# Patient Record
Sex: MALE | Race: WHITE | NOT HISPANIC OR LATINO | Employment: FULL TIME | URBAN - METROPOLITAN AREA
[De-identification: names, ages, dates, MRNs, and addresses within clinical notes are randomized per-mention and may not be internally consistent; named-entity substitution may affect disease eponyms.]

---

## 2017-01-18 ENCOUNTER — ANESTHESIA EVENT (OUTPATIENT)
Dept: GASTROENTEROLOGY | Facility: AMBULARY SURGERY CENTER | Age: 62
End: 2017-01-18
Payer: COMMERCIAL

## 2017-01-19 ENCOUNTER — ANESTHESIA (OUTPATIENT)
Dept: GASTROENTEROLOGY | Facility: AMBULARY SURGERY CENTER | Age: 62
End: 2017-01-19
Payer: COMMERCIAL

## 2017-01-19 ENCOUNTER — HOSPITAL ENCOUNTER (OUTPATIENT)
Facility: AMBULARY SURGERY CENTER | Age: 62
Setting detail: OUTPATIENT SURGERY
Discharge: HOME | End: 2017-01-19
Attending: INTERNAL MEDICINE | Admitting: INTERNAL MEDICINE
Payer: COMMERCIAL

## 2017-01-19 VITALS
HEIGHT: 68 IN | RESPIRATION RATE: 18 BRPM | DIASTOLIC BLOOD PRESSURE: 71 MMHG | TEMPERATURE: 96.8 F | BODY MASS INDEX: 27.28 KG/M2 | SYSTOLIC BLOOD PRESSURE: 148 MMHG | HEART RATE: 78 BPM | OXYGEN SATURATION: 97 % | WEIGHT: 180 LBS

## 2017-01-19 DIAGNOSIS — Z12.11 ENCOUNTER FOR SCREENING FOR MALIGNANT NEOPLASM OF COLON: ICD-10-CM

## 2017-01-19 DIAGNOSIS — K92.2 GASTROINTESTINAL HEMORRHAGE: ICD-10-CM

## 2017-01-19 PROCEDURE — 88305 TISSUE EXAM BY PATHOLOGIST: CPT | Performed by: INTERNAL MEDICINE

## 2017-01-19 RX ORDER — SODIUM CHLORIDE, SODIUM LACTATE, POTASSIUM CHLORIDE, CALCIUM CHLORIDE 600; 310; 30; 20 MG/100ML; MG/100ML; MG/100ML; MG/100ML
125 INJECTION, SOLUTION INTRAVENOUS CONTINUOUS
Status: DISCONTINUED | OUTPATIENT
Start: 2017-01-19 | End: 2017-01-19 | Stop reason: HOSPADM

## 2017-01-19 RX ORDER — PROPOFOL 10 MG/ML
INJECTION, EMULSION INTRAVENOUS AS NEEDED
Status: DISCONTINUED | OUTPATIENT
Start: 2017-01-19 | End: 2017-01-19 | Stop reason: SURG

## 2017-01-19 RX ADMIN — LIDOCAINE HYDROCHLORIDE 60 MG: 20 INJECTION, SOLUTION INTRAVENOUS at 10:18

## 2017-01-19 RX ADMIN — PROPOFOL 100 MG: 10 INJECTION, EMULSION INTRAVENOUS at 10:29

## 2017-01-19 RX ADMIN — SODIUM CHLORIDE, SODIUM LACTATE, POTASSIUM CHLORIDE, AND CALCIUM CHLORIDE 125 ML/HR: .6; .31; .03; .02 INJECTION, SOLUTION INTRAVENOUS at 10:02

## 2017-01-19 RX ADMIN — PROPOFOL 50 MG: 10 INJECTION, EMULSION INTRAVENOUS at 10:23

## 2017-01-19 RX ADMIN — PROPOFOL 150 MG: 10 INJECTION, EMULSION INTRAVENOUS at 10:18

## 2017-01-22 ENCOUNTER — HOSPITAL ENCOUNTER (EMERGENCY)
Facility: HOSPITAL | Age: 62
Discharge: HOME | End: 2017-01-22
Attending: EMERGENCY MEDICINE
Payer: COMMERCIAL

## 2017-01-22 ENCOUNTER — APPOINTMENT (EMERGENCY)
Dept: RADIOLOGY | Facility: HOSPITAL | Age: 62
End: 2017-01-22
Payer: COMMERCIAL

## 2017-01-22 VITALS
TEMPERATURE: 98 F | RESPIRATION RATE: 20 BRPM | BODY MASS INDEX: 27.37 KG/M2 | HEART RATE: 87 BPM | OXYGEN SATURATION: 98 % | SYSTOLIC BLOOD PRESSURE: 129 MMHG | DIASTOLIC BLOOD PRESSURE: 76 MMHG | WEIGHT: 180 LBS

## 2017-01-22 DIAGNOSIS — J40 BRONCHITIS: Primary | ICD-10-CM

## 2017-01-22 LAB
FLUAV AG SPEC QL IA: NEGATIVE
FLUAV AG SPEC QL: DETECTED
FLUBV AG SPEC QL IA: NEGATIVE
FLUBV AG SPEC QL: ABNORMAL
RSV B RNA SPEC QL NAA+PROBE: ABNORMAL

## 2017-01-22 PROCEDURE — 99284 EMERGENCY DEPT VISIT MOD MDM: CPT

## 2017-01-22 PROCEDURE — 93005 ELECTROCARDIOGRAM TRACING: CPT | Performed by: EMERGENCY MEDICINE

## 2017-01-22 PROCEDURE — 87400 INFLUENZA A/B EACH AG IA: CPT | Performed by: EMERGENCY MEDICINE

## 2017-01-22 PROCEDURE — 87798 DETECT AGENT NOS DNA AMP: CPT | Performed by: EMERGENCY MEDICINE

## 2017-01-22 PROCEDURE — 71020 HB CHEST X-RAY 2VW FRONTAL&LATL: CPT

## 2017-01-22 RX ORDER — ALBUTEROL SULFATE 90 UG/1
2 AEROSOL, METERED RESPIRATORY (INHALATION) EVERY 4 HOURS PRN
Qty: 1 INHALER | Refills: 0 | Status: SHIPPED | OUTPATIENT
Start: 2017-01-22 | End: 2017-02-21

## 2017-01-22 RX ORDER — PREDNISONE 20 MG/1
20 TABLET ORAL DAILY
Qty: 5 TABLET | Refills: 0 | Status: SHIPPED | OUTPATIENT
Start: 2017-01-22 | End: 2017-01-27

## 2017-01-22 RX ORDER — BENZONATATE 100 MG/1
200 CAPSULE ORAL EVERY 8 HOURS
Qty: 10 CAPSULE | Refills: 0 | Status: SHIPPED | OUTPATIENT
Start: 2017-01-22 | End: 2017-01-25

## 2017-01-22 RX ORDER — AZITHROMYCIN 250 MG/1
250 TABLET, FILM COATED ORAL DAILY
Qty: 6 TABLET | Refills: 0 | Status: SHIPPED | OUTPATIENT
Start: 2017-01-22 | End: 2017-01-26

## 2017-01-22 RX ORDER — AZITHROMYCIN 250 MG/1
500 TABLET, FILM COATED ORAL ONCE
Status: COMPLETED | OUTPATIENT
Start: 2017-01-22 | End: 2017-01-22

## 2017-01-22 RX ADMIN — AZITHROMYCIN 500 MG: 250 TABLET, FILM COATED ORAL at 12:07

## 2017-01-24 LAB
ATRIAL RATE: 83 BPM
P AXIS: 81 DEGREES
PR INTERVAL: 146 MS
QRS AXIS: -81 DEGREES
QRSD INTERVAL: 86 MS
QT INTERVAL: 366 MS
QTC INTERVAL: 430 MS
T WAVE AXIS: 65 DEGREES
VENTRICULAR RATE: 83 BPM

## 2017-12-21 ENCOUNTER — GENERIC CONVERSION - ENCOUNTER (OUTPATIENT)
Dept: OTHER | Facility: OTHER | Age: 62
End: 2017-12-21

## 2017-12-21 ENCOUNTER — HOSPITAL ENCOUNTER (OUTPATIENT)
Dept: RADIOLOGY | Facility: HOSPITAL | Age: 62
Discharge: HOME | End: 2017-12-21
Attending: INTERNAL MEDICINE
Payer: COMMERCIAL

## 2017-12-21 ENCOUNTER — TRANSCRIBE ORDERS (OUTPATIENT)
Dept: ADMINISTRATIVE | Facility: HOSPITAL | Age: 62
End: 2017-12-21

## 2017-12-21 DIAGNOSIS — R05.9 COUGH: ICD-10-CM

## 2017-12-21 DIAGNOSIS — J06.9 UPPER RESPIRATORY TRACT INFECTION, UNSPECIFIED TYPE: Primary | ICD-10-CM

## 2017-12-21 DIAGNOSIS — R06.02 SOB (SHORTNESS OF BREATH): ICD-10-CM

## 2017-12-21 PROCEDURE — 71020 HB CHEST X-RAY 2VW FRONTAL&LATL: CPT

## 2017-12-23 ENCOUNTER — HOSPITAL ENCOUNTER (EMERGENCY)
Facility: HOSPITAL | Age: 62
Discharge: HOME | End: 2017-12-23
Attending: EMERGENCY MEDICINE | Admitting: EMERGENCY MEDICINE
Payer: COMMERCIAL

## 2017-12-23 VITALS
BODY MASS INDEX: 26.84 KG/M2 | HEIGHT: 67 IN | TEMPERATURE: 96.9 F | RESPIRATION RATE: 24 BRPM | DIASTOLIC BLOOD PRESSURE: 93 MMHG | OXYGEN SATURATION: 92 % | SYSTOLIC BLOOD PRESSURE: 136 MMHG | WEIGHT: 171 LBS | HEART RATE: 81 BPM

## 2017-12-23 DIAGNOSIS — J44.9 COPD (CHRONIC OBSTRUCTIVE PULMONARY DISEASE) (HCC): Primary | ICD-10-CM

## 2017-12-23 PROCEDURE — 94640 AIRWAY INHALATION TREATMENT: CPT

## 2017-12-23 PROCEDURE — 99283 EMERGENCY DEPT VISIT LOW MDM: CPT

## 2017-12-23 RX ORDER — AMOXICILLIN AND CLAVULANATE POTASSIUM 500; 125 MG/1; MG/1
1 TABLET, FILM COATED ORAL EVERY 8 HOURS SCHEDULED
COMMUNITY
End: 2019-03-15 | Stop reason: ALTCHOICE

## 2017-12-23 RX ORDER — PREDNISONE 50 MG/1
50 TABLET ORAL DAILY
Qty: 5 TABLET | Refills: 0 | Status: SHIPPED | OUTPATIENT
Start: 2017-12-23 | End: 2017-12-28

## 2017-12-23 RX ORDER — GUAIFENESIN/DEXTROMETHORPHAN 100-10MG/5
10 SYRUP ORAL ONCE
Status: COMPLETED | OUTPATIENT
Start: 2017-12-23 | End: 2017-12-23

## 2017-12-23 RX ORDER — PREDNISONE 20 MG/1
60 TABLET ORAL ONCE
Status: COMPLETED | OUTPATIENT
Start: 2017-12-23 | End: 2017-12-23

## 2017-12-23 RX ORDER — ALBUTEROL SULFATE 90 UG/1
2 AEROSOL, METERED RESPIRATORY (INHALATION) EVERY 6 HOURS PRN
COMMUNITY
End: 2018-01-31 | Stop reason: SDUPTHER

## 2017-12-23 RX ADMIN — ALBUTEROL SULFATE 5 MG: 2.5 SOLUTION RESPIRATORY (INHALATION) at 08:33

## 2017-12-23 RX ADMIN — GUAIFENESIN AND DEXTROMETHORPHAN 10 ML: 100; 10 SYRUP ORAL at 08:32

## 2017-12-23 RX ADMIN — IPRATROPIUM BROMIDE 0.5 MG: 0.5 SOLUTION RESPIRATORY (INHALATION) at 08:33

## 2017-12-23 RX ADMIN — PREDNISONE 60 MG: 20 TABLET ORAL at 08:31

## 2017-12-23 NOTE — ED PROVIDER NOTES
History  Chief Complaint   Patient presents with    Cough     patient seen by Cheng Pickens on Monday for cough and congestion  Placed on antibiotics  Has increased SOB on ambulation, so Scot put him on Proventil  63-year-old male with past medical history of arthritis and tobacco abuse, presents to the ER with complaint of shortness of breath over the past week  Patient saw his PCP 4 days ago and was placed on Augmentin and albuterol  Patient had a chest x-ray done 2 days ago that showed concern for COPD however no acute infiltrates  Patient came to the ER as his shortness of breath persists  Patient notes that he has had dry cough  Patient denies any chest pain, fevers  Patient has had some chills  Patient continues to be a everyday smoker  History provided by:  Patient  Cough   Associated symptoms: shortness of breath    Associated symptoms: no chest pain, no fever, no headaches, no sore throat and no wheezing        Prior to Admission Medications   Prescriptions Last Dose Informant Patient Reported? Taking? albuterol (PROVENTIL HFA,VENTOLIN HFA) 90 mcg/act inhaler 12/23/2017 at Unknown time  Yes Yes   Sig: Inhale 2 puffs every 6 (six) hours as needed for wheezing   amoxicillin-clavulanate (AUGMENTIN) 500-125 mg per tablet 12/23/2017 at Unknown time  Yes Yes   Sig: Take 1 tablet by mouth every 8 (eight) hours   aspirin 81 MG tablet 12/23/2017 at Unknown time  Yes Yes   Sig: Take 81 mg by mouth daily   nicotine (NICODERM CQ) 7 mg/24hr TD 24 hr patch 12/23/2017 at Unknown time  Yes Yes   Sig: Place 1 patch on the skin every 24 hours      Facility-Administered Medications: None       Past Medical History:   Diagnosis Date    Arthritis     hands       Past Surgical History:   Procedure Laterality Date    COLONOSCOPY N/A 1/19/2017    Procedure: COLONOSCOPY;  Surgeon: Ant Linares MD;  [facility]; Service:        History reviewed  No pertinent family history    I have reviewed and agree with the history as documented  Social History   Substance Use Topics    Smoking status: Former Smoker     Packs/day: 1 00     Years: 43 00    Smokeless tobacco: Never Used      Comment: quit two days ago 12/21/2017    Alcohol use Yes      Comment: weekends        Review of Systems   Constitutional: Negative for activity change, fatigue and fever  HENT: Negative for congestion, ear discharge and sore throat  Eyes: Negative for pain and redness  Respiratory: Positive for cough and shortness of breath  Negative for chest tightness and wheezing  Cardiovascular: Negative for chest pain  Gastrointestinal: Negative for abdominal pain, diarrhea, nausea and vomiting  Endocrine: Negative for cold intolerance  Genitourinary: Negative for dysuria and urgency  Musculoskeletal: Negative for arthralgias and back pain  Neurological: Negative for dizziness, weakness and headaches  Psychiatric/Behavioral: Negative for agitation and behavioral problems  Physical Exam  ED Triage Vitals   Temperature Pulse Respirations Blood Pressure SpO2   12/23/17 0803 12/23/17 0803 12/23/17 0803 12/23/17 0806 12/23/17 0803   (!) 96 9 °F (36 1 °C) 81 (!) 24 136/93 92 %      Temp Source Heart Rate Source Patient Position - Orthostatic VS BP Location FiO2 (%)   12/23/17 0803 12/23/17 0803 12/23/17 0806 12/23/17 0806 --   Tympanic Monitor Sitting Left arm       Pain Score       12/23/17 0803       No Pain           Orthostatic Vital Signs  Vitals:    12/23/17 0803 12/23/17 0806   BP:  136/93   Pulse: 81    Patient Position - Orthostatic VS:  Sitting       Physical Exam   Constitutional: He is oriented to person, place, and time  He appears well-developed and well-nourished  HENT:   Head: Normocephalic and atraumatic  Nose: Nose normal    Mouth/Throat: Oropharynx is clear and moist    Eyes: Conjunctivae and EOM are normal    Neck: Normal range of motion  Neck supple     Cardiovascular: Normal rate, regular rhythm and normal heart sounds  Pulmonary/Chest: Effort normal  He has wheezes  Scattered expiratory wheezing noted throughout lungs bilateral    Abdominal: Soft  Bowel sounds are normal  He exhibits no distension  There is no tenderness  Musculoskeletal: Normal range of motion  Neurological: He is alert and oriented to person, place, and time  Skin: Skin is warm  Psychiatric: He has a normal mood and affect  His behavior is normal  Judgment and thought content normal    Nursing note and vitals reviewed  ED Medications  Medications   predniSONE tablet 60 mg (60 mg Oral Given 12/23/17 0831)   albuterol inhalation solution 5 mg (5 mg Nebulization Given 12/23/17 0833)   ipratropium (ATROVENT) 0 02 % inhalation solution 0 5 mg (0 5 mg Nebulization Given 12/23/17 0833)   dextromethorphan-guaiFENesin (ROBITUSSIN DM)  mg/5 mL oral syrup 10 mL (10 mL Oral Given 12/23/17 3334)       Diagnostic Studies  Results Reviewed     None                 No orders to display              Procedures  Procedures       Phone Contacts  ED Phone Contact    ED Course  ED Course as of Dec 23 0856   Sat Dec 23, 2017   9183 Patient re-examined at bedside  Patient feels better with steroids and neb treatment  MDM  Number of Diagnoses or Management Options  COPD (chronic obstructive pulmonary disease) [facility]):   Diagnosis management comments: Review chest x-ray from 2 days ago that was unremarkable  Give prednisone, neb treatment, Robitussin and reassess symptoms  Amount and/or Complexity of Data Reviewed  Tests in the radiology section of CPT®: reviewed  Tests in the medicine section of CPT®: ordered and reviewed  Review and summarize past medical records: yes  Independent visualization of images, tracings, or specimens: yes    Risk of Complications, Morbidity, and/or Mortality  General comments: Patient's lung aeration improved with nebulizer and steroids    Patient states he felt much better after the medications  Scattered wheezing continued throughout bilateral lungs  At this point patient's symptoms are most likely secondary to bronchitis/COPD exacerbation  Patient is discharged home with continuation of his antibiotics and albuterol inhaler as prescribed by his PCP  Patient is prescribed prednisone burst   Patient to follow up with his PCP in 2-3 days  Patient agrees with discharge plan  Patient well appearing at time of discharge  Patient Progress  Patient progress: improved    CritCare Time    Disposition  Final diagnoses:   COPD (chronic obstructive pulmonary disease) ([facility] [place] [address] )     Time reflects when diagnosis was documented in both MDM as applicable and the Disposition within this note     Time User Action Codes Description Comment    12/23/2017  8:12 AM Rayna Bowling Add [J44 9] COPD (chronic obstructive pulmonary disease) [facility])       ED Disposition     ED Disposition Condition Comment    Discharge  Brenita Reveal discharge to home/self care  Condition at discharge: Good        Follow-up Information     Follow up With Specialties Details Why Mouna Chris MD Internal Medicine In 3 days  [address] [id number]  [phone]          Patient's Medications   Discharge Prescriptions    PREDNISONE 50 MG TABLET    Take 1 tablet by mouth daily for 5 days       Start Date: 12/23/2017End Date: 12/28/2017       Order Dose: 50 mg       Quantity: 5 tablet    Refills: 0     No discharge procedures on file      ED Provider  Electronically Signed by           Gabby Ortez DO  12/23/17 3974

## 2017-12-23 NOTE — DISCHARGE INSTRUCTIONS
Please take a list of all of your medications and discharge paperwork with you to all of your follow-up medical visits  Please take all of your medications as directed  Continue your albuterol inhaler every 6 hours for shortness of breath  You can take over-the-counter Robitussin for cough  Please call your family doctor or return to the ER if you have increased shortness of breath, chest pain, fevers, chills, nausea, vomiting, diarrhea, or any other worsening symptoms  COPD (Chronic Obstructive Pulmonary Disease)   WHAT YOU NEED TO KNOW:   Chronic obstructive pulmonary disease (COPD) is a lung disease that makes it hard for you to breathe  It is usually a result of lung damage caused by years of irritation and inflammation in your lungs  DISCHARGE INSTRUCTIONS:   Call 911 if:   · You feel lightheaded, short of breath, and have chest pain  Return to the emergency department if:   · You are confused, dizzy, or feel faint  · Your arm or leg feels warm, tender, and painful  It may look swollen and red  · You cough up blood  Contact your healthcare provider if:   · You have more shortness of breath than usual      · You need more medicine than usual to control your symptoms  · You are coughing or wheezing more than usual      · You are coughing up more mucus, or it is a different color or has a different odor  · You gain more than 3 pounds in a week  · You have a fever, a runny or stuffy nose, and a sore throat, or other cold or flu symptoms  · Your skin, lips, or nails start to turn blue  · You have swelling in your legs or ankles  · You are very tired or weak for more than a day  · You notice changes in your mood, or changes in your ability to think or concentrate  · You have questions or concerns about your condition or care  Medicines:   · Medicines  may be used to open your airways, decrease swelling and inflammation in your lungs, or treat an infection  You may need 2 or more medicines  A short-acting medicine relieves symptoms quickly  Long-acting medicines will control or prevent symptoms  Ask for more information about the medicines you are given and how to use them safely  · Take your medicine as directed  Contact your healthcare provider if you think your medicine is not helping or if you have side effects  Tell him or her if you are allergic to any medicine  Keep a list of the medicines, vitamins, and herbs you take  Include the amounts, and when and why you take them  Bring the list or the pill bottles to follow-up visits  Carry your medicine list with you in case of an emergency  Help make breathing easier:   · Use pursed-lip breathing any time you feel short of breath  Take a deep breath in through your nose  Slowly breathe out through your mouth with your lips pursed for twice as long as you inhaled  You can also practice this breathing pattern while you bend, lift, climb stairs, or exercise  It slows down your breathing and helps move more air in and out of your lungs  · Do not smoke, and avoid others who smoke  Nicotine and other substances can cause lung irritation or damage and make it harder for you to breathe  Do not use e-cigarettes or smokeless tobacco  They still contain nicotine  Ask your healthcare provider for information if you currently smoke and need help to quit  For support and more information:  ¨ [employer]  Phone: 3- 092 - 371-7986  Web Address: [employer]      · Be aware of and avoid anything that makes your symptoms worse  Stay out of high altitudes and places with high humidity  Stay inside, or cover your mouth and nose with a scarf when you are outside during cold weather  Stay inside on days when air pollution or pollen counts are high  Do not use aerosol sprays such as deodorant, bug spray, and hair spray    Manage COPD and help prevent exacerbations:  COPD is a serious condition that gets worse over time  A COPD exacerbation means your symptoms suddenly get worse  It is important to prevent exacerbations  An exacerbation can cause more lung damage  COPD cannot be cured, but you can take action to feel better and prevent COPD exacerbations:  · Protect yourself from germs  Germs can get into your lungs and cause an infection  An infection in your lungs can create more mucus and make it harder to breathe  An infection can also create swelling in your airways and prevent air from getting in  You can decrease your risk for infection by doing the following:     [facility] your hands often with soap and water  Carry germ-killing gel with you  You can use the gel to clean your hands when soap and water are not available  ¨ Do not touch your eyes, nose, or mouth unless you have washed your hands first      ¨ Always cover your mouth when you cough  Cough into a tissue or your shirtsleeve so you do not spread germs from your hands  ¨ Try to avoid people who have a cold or the flu  If you are sick, stay away from others as much as possible  · Drink more liquids  This will help to keep your air passages moist and help you cough up mucus  Ask how much liquid to drink each day and which liquids are best for you  · Exercise daily  Exercise for at least 20 minutes each day to help increase your energy and decrease shortness of breath  Walking or riding a bike are good ways to exercise  Talk to your healthcare provider about the best exercise plan for you  · Ask about vaccines  Your healthcare provider may recommend that you get regular flu and pneumonia vaccines  Pneumonia can become life-threatening for a person who has COPD  Ask about other vaccines you may need  Ask your healthcare provider about the flu and pneumonia vaccines  All adults should get the flu (influenza) vaccine every year as soon as it becomes available   The pneumonia vaccine is given to adults aged 72 or older to prevent pneumococcal disease, such as pneumonia  Adults aged 23 to 59 years who are at high risk for pneumococcal disease also should get the pneumococcal vaccine  It may need to be repeated 1 or 5 years later  Pulmonary rehabilitation:  Your healthcare provider may recommend a program to help you manage your symptoms and improve your quality of life  It may include nutritional counseling and exercise to strengthen your lungs  Make decisions about your choices for future treatment:  Ask for information about advanced medical directives and living alvarez  These documents help you decide and write down your choices for treatment and end-of-life care  It is best to complete them when you feel well and can think clearly about your wishes  The information can then be kept for future use if you are in the hospital or become very ill  Follow up with your healthcare provider as directed: You may need more tests  Your healthcare provider may refer you to a pulmonary (lung) specialist  Write down your questions so you remember to ask them during your visits  © 2017 [address] [employer] Information is for End User's use only and may not be sold, redistributed or otherwise used for commercial purposes  All illustrations and images included in CareNotes® are the copyrighted property of [employer]  or [employer]itesh Lennon  The above information is an  only  It is not intended as medical advice for individual conditions or treatments  Talk to your doctor, nurse or pharmacist before following any medical regimen to see if it is safe and effective for you

## 2017-12-26 ENCOUNTER — GENERIC CONVERSION - ENCOUNTER (OUTPATIENT)
Dept: OTHER | Facility: OTHER | Age: 62
End: 2017-12-26

## 2017-12-26 ENCOUNTER — ALLSCRIPTS OFFICE VISIT (OUTPATIENT)
Dept: OTHER | Facility: OTHER | Age: 62
End: 2017-12-26

## 2017-12-27 NOTE — CONSULTS
Assessment   1  COPD with acute exacerbation (491 21) (J44 1)   2  Centrilobular emphysema (492 8) (J43 2)   3  Current every day smoker (305 1) (F17 200)   4  Dyspnea on exertion (786 09) (R06 09)    Plan   Centrilobular emphysema    · Albuterol Sulfate (2 5 MG/3ML) 0 083% Inhalation Nebulization Solution; USE 1    UNIT DOSE IN NEBULIZER 4 TIMES DAILY   Rx By: Breanna Corcoran; Dispense: 30 Days ; #:3 X 3 ML Plas Cont (60 Plas Conts); Refill: 5;For: Centrilobular emphysema; MICHELLE = N; Verified Transmission to [address] [place] [address]; Last Updated By: System, Sure[employer]; 12/26/2017 2:19:14 PM   · Anoro Ellipta 62 5-25 MCG/INH Inhalation Aerosol Powder Breath Activated; INHALE    1 PUFFS Daily   Rx By: Breanna Corcoran; Dispense: 30 Days ; #:1 Aerosol Powder Breath Activated; Refill: 5;For: Centrilobular emphysema; MICHELLE = N; Print Rx  Centrilobular emphysema, COPD with acute exacerbation    · PredniSONE 10 MG Oral Tablet; take 4 tabs/day x 3 days, 3 tabs/ day x 3 days, 2    tabs/day x 3 days, then 1 tab/day x 3 days   Rx By: Breanna Corcoran; Dispense: 12 Days ; #:30 Tablet; Refill: 0;For: Centrilobular emphysema, COPD with acute exacerbation; MICHELLE = N; Verified Transmission to [address] [place] [address]; Last Updated By: System, SureScripts; 12/26/2017 2:19:14 PM   · Respiratory Equipment Nebulizer; Status:Complete;   Done: 74FPP6721 02:09PM   Perform:Not Applicable; Order Comments:[facility]; Due:[id number];Ordered; For:Centrilobular emphysema, COPD with acute exacerbation; Ordered By:Jameson Nicolas;  Centrilobular emphysema, Dyspnea on exertion    · SPIROMETRY W/ BRONCHO- POC; Status:Complete;   Done: 32FVM1419 12:00AM   Performed: In Office; KIQ:[id number];FSPWDRQ; Today; For:Centrilobular emphysema, Dyspnea on exertion; Ordered By:Jameson Nicolas;  Unlinked    · Ipratropium-Albuterol 0 5-2 5 (3) MG/3ML Inhalation Solution   Dose of 1 VIAL;  Inhalation; MICHELLE = N; Administered by: Abraham Anger: 12/26/2017 12:00:00 AM; Last Updated By: Zoran Guerin; 12/26/2017 1:28:59 PM    Results/Data   Results Free Text Form [facility]:    Results      Other room air O2 sat at rest is 91 o 93% and with ambulating 250 feet was 91%  PFT Results v2:      Spirometry: Forced vital capacity: 2 46L-- and-- 58% Predicted Values  Forced expiratory volume in one second: 0 92L-- and-- 29% Predicted Value  FEV1/FVC ratio is 37  Post Bronchodilator Spirometry: Forced vital capacity : 3 06L-- and-- 73% Predicted Values  Forced expiratory volume in one second : 1 05L-- and-- 33% Predicted Value  FEV1/FVC ratio is 34  Lung Volumes:    DLCO:       PFT Interpretation:      Severe airflow obstruction with a significant improvement following bronchodilator  Forced vital capacity improved by 24% and FEV1 improved by 15%  Mild restrictive impairment likely due to air trapping  Discussion/Summary   Discussion Summary:    Acute exacerbation of COPD which was triggered by upper respiratory tract infection  He completed a course of Augmentin  Spirometry today shows severe airflow obstruction with a significant improvement in FEV1 of 15% after bronchodilator therapy  Even after bronchodilator therapy FEV1 is severely decreased at 1 05 L or 33% of predicted  Obstructive index is also severely decreased at 34%  The mild decrease in forced vital capacity of 3 06 L 73% of predicted is likely due to air trapping from COPD    just quit smoking 1 week ago and states he is going to remain smoke free  I encouraged him to remain smoke-free and stressed critical importance of this  This time does not feel he needs any type of nicotine supplement or Chantix  does have a Proventil inhaler  He will complete 2 more days of prednisone 50 mg which was prescribed to many ER on is seen for shortness of breath on 12/23  This be 5 days of prednisone 50 mg daily   He was then take 40 mg of prednisone for 3 days and taper by 10 mg every 4th day  Last dose of prednisone will be January 9th  did give him a sample of Anoro to use 1 puff daily and demonstrated to him how to use it  I also prescribed a nebulizer which he will use 2 5 mg of albuterol and 4 times a day  He also stops to his Proventil inhaler for this   works as a  at a school and I advised 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 to return to work to re-evaluate him on January 9th  did discuss the diagnosis of COPD with him the critical importance of him remaining smoke-free  Also at a later date I will order a complete pulmonary function test on Mami Maldonado    will do spirometry next office visit to see if there is any further improvement in his severe airflow obstruction with above therapy  also range from the have a home nebulizer with 2 5 mg albuterol he can use 4 times a day for now  did review his chest x-ray that was done December 21st  No infiltrates  in 2 weeks  Goals and Barriers: The patient has the current Goals: Try to refrain from smoking cigarettes  Patient's Capacity to Self-Care: Patient is able to Self-Care  Medication SE Review and Pt Understands Tx: Possible side effects of new medications were reviewed with the patient/guardian today  The treatment plan was reviewed with the patient/guardian  The patient/guardian understands and agrees with the treatment plan      Active Problems    · Encounter for screening for malignant neoplasm of colon (V76 51) (Z12 11)    Chief Complaint   Chief Complaint Free Text Note Form: Mami Maldonado is here today for c/o SOB increasing over the last 5 days  History of Present Illness   HPI: Mami Maldonado is a 58year-old male who presents for evaluation of severe dyspnea since feeling ill about 1 week ago  He states he started to have some fatigue, nonproductive cough and some itching in his ears  No fever chills  He saw his physician, Dr Clark Pineda, and was prescribed Augmentin 500 mg t i d  for 7 days for what appeared to be bilateral otitis media   Maurie Nissen as noted has had increasing shortness of breath since then  And scan to the point were used was feeling fatigued anemia and shortness of Breath with trying to put his clothes on  Earlier when he had been treated for his URI with augment he had to work the evening shift as a  at a school as there was no one to replace some  He states he had great difficulty doing his job was very short of breath with doing the cleaning chores  He is not have any chest pain fever chills  As he was still having some cough congestion shortness of breath he went to emergency room for evaluation on December 23rd  His room air O2 saturation was 92% and he was noted to have some wheezing  He was given a nebulizer treatment with albuterol ipratropium and also given prednisone 60 mg per day  He then received prescription of prednisone 50 mg daily for 5 days  Chest x-ray done then showed no infiltrates  Despite the prednisone he still having shortness of breath with minimal activity  No nocturnal dyspnea  His cough has improved  He is not having any chest pain  He does have occasional wheezing  He states he has not smoked in 1 week now  Normally when he is not L he states he will have some shortness of breath going up a flight of stairs but he is able to do his duties as a  without any shortness of breath  He was also prescribed Benzonatate 100 mg that he used for his cough and Robitussin with codeine  does not have any history of heart disease, hypertension, or hyperlipidemia  He was prescribed Proventil inhaler recently but normally is not on any medication  He has no prior history of COPD or asthma      is not have any fever or chills  Review of Systems   Complete-Male Pulm:      Constitutional: No fever or chills, feels well, no tiredness, no recent weight gain or weight loss  Eyes: no complaints of vision problems  ENT: no rhinitis, no PND, no epistaxis        Cardiovascular: no palpitations, no chest pain  Respiratory: as noted in HPI  Gastrointestinal: no complaints of esophageal reflux, no abdominal pain  Genitourinary: no urinary retention  Musculoskeletal: no arthralgias, no joint swelling, no myalgias  Integumentary: no rash, no lesions  Neurological: no headache, no fainting, no weakness  Psychiatric: no anxiety, no depression  Hematologic/Lymphatic: no complaints of swollen glands  ROS Reviewed:    ROS reviewed  Surgical History   Surgical History Reviewed: The surgical history was reviewed and updated today  Family History   Mother    1  Family history of cardiac disorder (V17 49) (Z82 49)  Family History Reviewed: The family history was reviewed and updated today  Social History    · Current every day smoker (305 1) (F17 200)   · Patient has smoked anywhere from 1-1 5 packs of cigarettes per day since age 25  States he only quit 1 week ago December 15, 2017 since he has been sick with        this URI and shortness of Breath  Social History Reviewed: The social history was reviewed and updated today  The social history was reviewed and is unchanged  Current Meds    1  Cheratussin -10 MG/5ML Oral Syrup; Therapy: (Recorded:42Cnu9365) to Recorded   2  Claritin-D 12 Hour 5-120 MG Oral Tablet Extended Release 12 Hour; As needed for sinus     pain; Therapy: 10QFJ9939 to  Requested for: 71DLH2129 Recorded   3  Motrin 800 MG TABS; As needed for neck pain; Therapy: 43VQO8790 to  Requested for: 39IXL6946 Recorded   4  PredniSONE 50 MG Oral Tablet; Therapy: (Recorded:99Drg6202) to Recorded   5  Proventil  (90 Base) MCG/ACT Inhalation Aerosol Solution; Therapy: (Recorded:26Pum8522) to Recorded   6  Suprep Bowel Prep Kit 17 5-3 13-1 6 GM/180ML Oral Solution; as directed; Therapy: 76WXJ9140 to  Requested for: 91Yfd0372 Recorded  Medication List Reviewed:     The medication list was reviewed and updated today  Allergies   1  No Known Drug Allergies    Vitals   Vital Signs    Recorded: 33CDY4711 12:55PM   Temperature 97 6 F, Oral   Heart Rate 80   Pulse Quality Normal   Respiration Quality Normal   Respiration 16   Systolic 674, RUE, Sitting   Diastolic 88, RUE, Sitting   Height 5 ft 7 in   Weight 163 lb    BMI Calculated 25 53   BSA Calculated 1 85   O2 Saturation 91, RA     Physical Exam        Constitutional      General appearance: No acute distress, well appearing and well nourished  Eyes      Examination of pupil and irises: Anicteric, pupils reactive  Ears, Nose, Mouth, and Throat      External inspection of ears and nose: Normal        Nasal mucosa, septum, and turbinates: Normal without edema or erythema  Lips, teeth, and gums: Normal, good dentition  Oropharynx: Normal with no erythema, edema, exudate or lesions  Neck      Neck: Supple, symmetric, trachea midline, no masses  Jugular veins: Normal        Pulmonary      Chest: Normal        Respiratory effort: No increased work of breathing or signs of respiratory distress  Auscultation of lungs: Abnormal   Auscultation of the lungs revealed decreased breath sounds diffusely  no rales or crackles were heard bilaterally  no rhonchi  no wheezing  Cardiovascular      Auscultation of heart: Normal rate and rhythm, normal S1 and S2, no murmurs  Examination of extremities for edema and/or varicosities: Normal        Abdomen      Abdomen: Soft, non-tender  Lymphatic      Palpation of lymph nodes in neck: No lymphadenopathy  Musculoskeletal      Gait and station: Normal        Digits and nails: Normal without clubbing or cyanosis  Neurologic      Mental Status: Normal  Not confused, no evidence of dementia, good comprehension, good concentration  Motor Exam: Gross motor function normal        Skin      Skin and subcutaneous tissue: Limited exam shows no rash         Psychiatric Orientation to person, place and time: Normal        Mood and affect: Normal        Future Appointments      Date/Time Provider Specialty Site   01/09/2018 12:30 PM VERONICA Stinson  Pulmonary Medicine [facility] PULMONARY [facility] DIAGNOSTIC [facility]   12/28/2017 01:00 PM MARK Talley   Cardiology  [address] [facility]     Signatures    Electronically signed by : VERONICA Cespedes ; Dec 26 2017  3:15PM EST                       (Author)

## 2017-12-29 ENCOUNTER — GENERIC CONVERSION - ENCOUNTER (OUTPATIENT)
Dept: SLEEP CENTER | Facility: CLINIC | Age: 62
End: 2017-12-29

## 2018-01-08 ENCOUNTER — GENERIC CONVERSION - ENCOUNTER (OUTPATIENT)
Dept: OTHER | Facility: OTHER | Age: 63
End: 2018-01-08

## 2018-01-23 VITALS
RESPIRATION RATE: 16 BRPM | SYSTOLIC BLOOD PRESSURE: 150 MMHG | TEMPERATURE: 97.6 F | DIASTOLIC BLOOD PRESSURE: 88 MMHG | HEIGHT: 67 IN | BODY MASS INDEX: 25.58 KG/M2 | WEIGHT: 163 LBS | OXYGEN SATURATION: 91 % | HEART RATE: 80 BPM

## 2018-01-23 NOTE — MISCELLANEOUS
Message  Return to work or school:   Junaa Pablo is under my professional care  He was seen in my office on 12/26/2017   He is able to return to work on  01/10/2017            Signatures  Electronically signed by :  Lu Shields, ; Dec 26 2017  2:06PM EST                       (Author)

## 2018-01-23 NOTE — MISCELLANEOUS
Message  Return to work or school:   Alla Higginbotham is under my professional care  He was seen in my office on 12/26/2017    He is not able to return to work until 02/01/2018     Patient may not return back to work until seen again on 1/31/2018  Signatures   Electronically signed by :  Olya Waters, ; Jan 8 2018  1:12PM EST                       (Author)

## 2018-01-31 ENCOUNTER — OFFICE VISIT (OUTPATIENT)
Dept: PULMONOLOGY | Facility: MEDICAL CENTER | Age: 63
End: 2018-01-31
Payer: COMMERCIAL

## 2018-01-31 VITALS
SYSTOLIC BLOOD PRESSURE: 142 MMHG | TEMPERATURE: 98.3 F | OXYGEN SATURATION: 96 % | BODY MASS INDEX: 27.57 KG/M2 | DIASTOLIC BLOOD PRESSURE: 86 MMHG | WEIGHT: 176 LBS | HEART RATE: 74 BPM

## 2018-01-31 DIAGNOSIS — J43.2 CENTRILOBULAR EMPHYSEMA (HCC): Primary | ICD-10-CM

## 2018-01-31 PROCEDURE — 99213 OFFICE O/P EST LOW 20 MIN: CPT | Performed by: INTERNAL MEDICINE

## 2018-01-31 RX ORDER — ALBUTEROL SULFATE 90 UG/1
2 AEROSOL, METERED RESPIRATORY (INHALATION) EVERY 4 HOURS PRN
Qty: 1 INHALER | Refills: 5 | Status: SHIPPED | OUTPATIENT
Start: 2018-01-31 | End: 2019-07-15 | Stop reason: SDUPTHER

## 2018-01-31 NOTE — PROGRESS NOTES
Assessment/Plan:     Problem List Items Addressed This Visit        Respiratory    Centrilobular emphysema ([address] ) - Primary     Centrilobular emphysema  Eunice Mack had a recent exacerbation for which I treated him with prednisone and he had a marked improvement  He only has minimal exertional dyspnea now with activity  He is using Anoro 1 puff daily and will continue with this medication  He has not smoked now in 6 weeks    He does have a ProAir rescue inhaler can use 2 puffs every 4 hours as needed but is not needing it now    I will order a complete pulmonary function test with diffusion capacity to recess is lung function  On previous initial visit he had severe airflow obstruction  I suspect there may be some improvement in his degree of airflow obstruction out and he has improved with prednisone therapy  On December 26 his FEV1 was severely decreased at 1 05 L or 33% predicted after a nebulizer treatment  I did advise him not to return to work until February 19, Monday  He works as a  and his work is strenuous  He will have a complete pulmonary function test done prior to returning to work  I encouraged him to remain smoke-free    He was accompanied by his wife on his visit today         Relevant Medications    Umeclidinium-Vilanterol (ANORO ELLIPTA) 62 5-25 MCG/INH AEPB    albuterol (PROAIR HFA) 90 mcg/act inhaler    Other Relevant Orders    Pulmonary function test            Return in about 3 months (around 4/30/2018)  All questions are answered to the patient's satisfaction and understanding  He verbalizes understanding  He is encouraged to call with any further questions or concerns  Portions of the record may have been created with voice recognition software  Occasional wrong word or "sound a like" substitutions may have occurred due to the inherent limitations of voice recognition software    Read the chart carefully and recognize, using context, where substitutions have occurred  ______________________________________________________________________    Chief Complaint:   Chief Complaint   Patient presents with    Follow-up       Patient ID: Shweta Doyle is a 58 y o  y o  male has a past medical history of Arthritis; Centrilobular emphysema (HCC); COPD (chronic obstructive pulmonary disease) ([facility] [address] ); BOB (dyspnea on exertion); and URI (upper respiratory infection)  1/31/2018  HPI  Review of Systems   Constitutional: Negative for chills, fatigue and fever  He is not having any shortness of breath with his daily activity  No cough or wheezing   HENT: Negative for congestion  Eyes: Negative for pain and redness  Respiratory: Negative for cough and wheezing  Cardiovascular: Negative for chest pain and leg swelling  Gastrointestinal: Negative for diarrhea and nausea  Endocrine: Negative for polydipsia and polyphagia  Genitourinary: Negative for dysuria  Musculoskeletal: Negative for joint swelling and myalgias  Neurological: Negative for dizziness and light-headedness  Psychiatric/Behavioral: The patient is not hyperactive  Smoking history: He reports that he quit smoking about 6 weeks ago  He started smoking about 45 years ago  He has a 43 00 pack-year smoking history  He has never used smokeless tobacco     The following portions of the patient's history were reviewed and updated as appropriate: past family history, past social history and past surgical history  There is no immunization history on file for this patient    Current Outpatient Prescriptions   Medication Sig Dispense Refill    aspirin 81 MG tablet Take 81 mg by mouth daily      Umeclidinium-Vilanterol (ANORO ELLIPTA) 62 5-25 MCG/INH AEPB Inhale 1 puff daily 1 each 8    albuterol (PROAIR HFA) 90 mcg/act inhaler Inhale 2 puffs every 4 (four) hours as needed for wheezing or shortness of breath 1 Inhaler 5    amoxicillin-clavulanate (AUGMENTIN) 500-125 mg per tablet Take 1 tablet by mouth every 8 (eight) hours      nicotine (NICODERM CQ) 7 mg/24hr TD 24 hr patch Place 1 patch on the skin every 24 hours       No current facility-administered medications for this visit  Allergies: Patient has no known allergies  Objective:  Vitals:    01/31/18 1106 01/31/18 1109   BP: 142/86    BP Location: Left arm    Patient Position: Sitting    Cuff Size: Adult    Pulse: 74    Temp: 98 3 °F (36 8 °C)    TempSrc: Oral    SpO2:  96%   Weight: 79 8 kg (176 lb)    Oxygen Therapy  SpO2: 96 %  Oxygen Therapy: None (Room air)    Wt Readings from Last 3 Encounters:   01/31/18 79 8 kg (176 lb)   12/26/17 73 9 kg (163 lb)   12/23/17 77 6 kg (171 lb)     Body mass index is 27 57 kg/m²  Physical Exam   Constitutional: He is oriented to person, place, and time  Patient appears to be in good condition  He is alert, no conversational dyspnea   HENT:   Head: Normocephalic  Nose: Nose normal    Mouth/Throat: Oropharynx is clear and moist  No oropharyngeal exudate  Eyes: Conjunctivae are normal    Neck: Neck supple  No JVD present  Cardiovascular: Normal rate, regular rhythm and normal heart sounds  Pulmonary/Chest: Effort normal    Lung sounds are clear to auscultation  Abdominal: Soft  He exhibits no distension  There is no tenderness  There is no guarding  Musculoskeletal: He exhibits no edema  Lymphadenopathy:     He has no cervical adenopathy  Neurological: He is alert and oriented to person, place, and time  Skin: Skin is warm and dry  No erythema  Psychiatric: He has a normal mood and affect   His behavior is normal  Thought content normal      Office Spirometry Results: 12/26/17  FEV1: 1 05 liters (33%)  FVC: 3 06 liters (73%)  FEV1/FVC: 34 3 %

## 2018-01-31 NOTE — LETTER
January 31, 2018     Patient: Alina López   YOB: 1955   Date of Visit: 1/31/2018       To Whom it May Concern:    Milly Mendoza is under my professional care  He was seen in my office on 1/31/2018  He may return to work on February 19th, Monday  If you have any questions or concerns, please don't hesitate to call           Sincerely,          Jamal Tom DO        CC: No Recipients

## 2018-01-31 NOTE — ASSESSMENT & PLAN NOTE
Centrilobular emphysema  Guillermina Oscar had a recent exacerbation for which I treated him with prednisone and he had a marked improvement  He only has minimal exertional dyspnea now with activity  He is using Anoro 1 puff daily and will continue with this medication  He has not smoked now in 6 weeks    He does have a ProAir rescue inhaler can use 2 puffs every 4 hours as needed but is not needing it now    I will order a complete pulmonary function test with diffusion capacity to recess is lung function  On previous initial visit he had severe airflow obstruction  I suspect there may be some improvement in his degree of airflow obstruction out and he has improved with prednisone therapy  I did advise him not to return to work until February 19, Monday  He works as a  and his work is strenuous  He will have a complete pulmonary function test done prior to returning to work      I encouraged him to remain smoke-free    He was accompanied by his wife on his visit today

## 2018-02-06 ENCOUNTER — HOSPITAL ENCOUNTER (OUTPATIENT)
Dept: PULMONOLOGY | Facility: HOSPITAL | Age: 63
Discharge: HOME | End: 2018-02-06
Attending: INTERNAL MEDICINE
Payer: COMMERCIAL

## 2018-02-06 DIAGNOSIS — J43.2 CENTRILOBULAR EMPHYSEMA (HCC): ICD-10-CM

## 2018-02-06 PROCEDURE — 94729 DIFFUSING CAPACITY: CPT

## 2018-02-06 PROCEDURE — 94726 PLETHYSMOGRAPHY LUNG VOLUMES: CPT | Performed by: INTERNAL MEDICINE

## 2018-02-06 PROCEDURE — 94760 N-INVAS EAR/PLS OXIMETRY 1: CPT

## 2018-02-06 PROCEDURE — 94726 PLETHYSMOGRAPHY LUNG VOLUMES: CPT

## 2018-02-06 PROCEDURE — 94729 DIFFUSING CAPACITY: CPT | Performed by: INTERNAL MEDICINE

## 2018-02-06 PROCEDURE — 94060 EVALUATION OF WHEEZING: CPT

## 2018-02-06 PROCEDURE — 94060 EVALUATION OF WHEEZING: CPT | Performed by: INTERNAL MEDICINE

## 2018-02-06 RX ORDER — ALBUTEROL SULFATE 2.5 MG/3ML
2.5 SOLUTION RESPIRATORY (INHALATION) ONCE
Status: DISCONTINUED | OUTPATIENT
Start: 2018-02-06 | End: 2018-02-10 | Stop reason: HOSPADM

## 2018-02-26 ENCOUNTER — DOCUMENTATION (OUTPATIENT)
Dept: PULMONOLOGY | Facility: MEDICAL CENTER | Age: 63
End: 2018-02-26

## 2018-06-04 ENCOUNTER — OFFICE VISIT (OUTPATIENT)
Dept: PULMONOLOGY | Facility: MEDICAL CENTER | Age: 63
End: 2018-06-04
Payer: COMMERCIAL

## 2018-06-04 VITALS
SYSTOLIC BLOOD PRESSURE: 148 MMHG | DIASTOLIC BLOOD PRESSURE: 80 MMHG | BODY MASS INDEX: 28.64 KG/M2 | HEART RATE: 68 BPM | OXYGEN SATURATION: 95 % | HEIGHT: 68 IN | TEMPERATURE: 97 F | WEIGHT: 189 LBS | RESPIRATION RATE: 18 BRPM

## 2018-06-04 DIAGNOSIS — J43.2 CENTRILOBULAR EMPHYSEMA (HCC): Primary | ICD-10-CM

## 2018-06-04 PROCEDURE — 99213 OFFICE O/P EST LOW 20 MIN: CPT | Performed by: INTERNAL MEDICINE

## 2018-06-04 NOTE — PROGRESS NOTES
Assessment/Plan:     Problem List Items Addressed This Visit        Respiratory    Centrilobular emphysema ([address] ) - Primary     Centrilobular emphysema is stable  Sheri Carney quit smoking 6 months ago and is doing much better  He only uses ProAir inhaler as needed  He is no longer using Anoro as he feels he does not need it  He did have a complete pulmonary function test done February 6, 2018 which showed a his forced vital capacity to be normal at 3 93 L or 93% predicted  FEV1 was moderately reduced at 2 27 L or 71% of predicted and obstructive index moderately decreased at 58%  There is mild hyperinflation as TLC was 7 83 L or 121% of predicted and moderate air trapping with residual volume of 156% of predicted  No significant change after bronchodilator  His diffusion capacity measurement was moderately decreased at 57% of predicted  I congratulated on his quitting smoking  I did tell him that if he wanted I could replace his albuterol inhaler with Combivent Respimat  As he is not needing is inhaler without often he will continue just with the ProAir for now  He does not appear to need to Anoro at this time  He stopped that several weeks ago and is doing well  He will call me if he has any problems otherwise follow-up in 1 year                 No Follow-up on file  All questions are answered to the patient's satisfaction and understanding  He verbalizes understanding  He is encouraged to call with any further questions or concerns  Portions of the record may have been created with voice recognition software  Occasional wrong word or "sound a like" substitutions may have occurred due to the inherent limitations of voice recognition software  Read the chart carefully and recognize, using context, where substitutions have occurred      ______________________________________________________________________    Chief Complaint:   Chief Complaint   Patient presents with    Follow-up     3M Patient ID: Bud Lamar is a 58 y o  y o  male has a past medical history of Arthritis; Centrilobular emphysema (HCC); COPD (chronic obstructive pulmonary disease) ([address] ); BOB (dyspnea on exertion); and URI (upper respiratory infection)  6/4/2018  HPI     Bud Lamar presents for a follow-up visit  He has centrilobular emphysema and I had initially seen him back in December when he had acute exacerbation of COPD  He responded nicely to a course of prednisone and was started on Anoro inhaler and a rescue albuterol inhaler and also given a nebulizer with albuterol  He quit smoking then and is doing much better  He states he is only having minimal exertional shortness of breath  No cough or wheezing  He stopped using the Anoro and only uses ProAir inhaler occasionally  He is not needing to uses nebulizer  He does work as a  at a school and works middle shift  He is not have any shortness of breath doing his job  He denies any chronic cough      Review of Systems   Constitutional: Negative for chills, fever and unexpected weight change  HENT: Negative for congestion, rhinorrhea and sore throat  Eyes: Negative for discharge and redness  Respiratory: Negative for cough, shortness of breath and wheezing  Cardiovascular: Negative for chest pain, palpitations and leg swelling  Gastrointestinal: Negative for abdominal distention, abdominal pain and nausea  Endocrine: Negative for polydipsia and polyphagia  Genitourinary: Negative for dysuria  Musculoskeletal: Negative for joint swelling and myalgias  Skin: Negative for rash  Neurological: Negative for light-headedness  Smoking history: He reports that he quit smoking about 5 months ago  He started smoking about 45 years ago  He has a 43 00 pack-year smoking history   He has never used smokeless tobacco     The following portions of the patient's history were reviewed and updated as appropriate: allergies, current medications, past family history, past medical history, past social history, past surgical history and problem list       There is no immunization history on file for this patient  Current Outpatient Prescriptions   Medication Sig Dispense Refill    aspirin 81 MG tablet Take 81 mg by mouth daily      Multiple Vitamins-Minerals (CENTRUM ADULTS PO) Take 1 tablet by mouth daily      albuterol (PROAIR HFA) 90 mcg/act inhaler Inhale 2 puffs every 4 (four) hours as needed for wheezing or shortness of breath 1 Inhaler 5    amoxicillin-clavulanate (AUGMENTIN) 500-125 mg per tablet Take 1 tablet by mouth every 8 (eight) hours      nicotine (NICODERM CQ) 7 mg/24hr TD 24 hr patch Place 1 patch on the skin every 24 hours       No current facility-administered medications for this visit  Allergies: Patient has no known allergies  Objective:  Vitals:    06/04/18 0952   BP: 148/80   BP Location: Left arm   Patient Position: Sitting   Cuff Size: Standard   Pulse: 68   Resp: 18   Temp: (!) 97 °F (36 1 °C)   TempSrc: Tympanic   SpO2: 95%   Weight: 85 7 kg (189 lb)   Height: 5' 8" (1 727 m)   Oxygen Therapy  SpO2: 95 %    Wt Readings from Last 3 Encounters:   06/04/18 85 7 kg (189 lb)   01/31/18 79 8 kg (176 lb)   12/26/17 73 9 kg (163 lb)     Body mass index is 28 74 kg/m²  Physical Exam   Constitutional: He is oriented to person, place, and time  He appears well-developed and well-nourished  No distress  HENT:   Head: Normocephalic  Nose: Nose normal    Mouth/Throat: Oropharynx is clear and moist  No oropharyngeal exudate  Eyes: Conjunctivae are normal  Pupils are equal, round, and reactive to light  Neck: Neck supple  No JVD present  No tracheal deviation present  Cardiovascular: Normal rate, regular rhythm and normal heart sounds  Pulmonary/Chest: Effort normal  He has no wheezes  He has no rales  Lung sounds are clear  There is good air flow bilaterally  Abdominal: Soft  He exhibits no distension   There is no tenderness  There is no guarding  Musculoskeletal: He exhibits no edema  Lymphadenopathy:     He has no cervical adenopathy  Neurological: He is alert and oriented to person, place, and time  Skin: Skin is warm and dry  No rash noted  Psychiatric: He has a normal mood and affect  His behavior is normal  Thought content normal          Diagnostics:  I have personally reviewed pertinent reports  Pulmonary function tests:  Done 2/6/18  FVC - 3 93 L    93%  FEV1 - 2 27 L   71%  FEV1/FVC% -  58%    RV - 3 34 L  156%  TLC - 7 83 L   121%    DLCO - 57%, DLCO/VA - 58%    Moderate airflow obstruction without any significant improvement following bronchodilator therapy  There is mild hyperinflation and moderate air trapping    Diffusion capacity is moderately reduced

## 2018-06-04 NOTE — ASSESSMENT & PLAN NOTE
Centrilobular emphysema is stable  Kristofer Moraes quit smoking 6 months ago and is doing much better  He only uses ProAir inhaler as needed  He is no longer using Anoro as he feels he does not need it  He did have a complete pulmonary function test done February 6, 2018 which showed a his forced vital capacity to be normal at 3 93 L or 93% predicted  FEV1 was moderately reduced at 2 27 L or 71% of predicted and obstructive index moderately decreased at 58%  There is mild hyperinflation as TLC was 7 83 L or 121% of predicted and moderate air trapping with residual volume of 156% of predicted  No significant change after bronchodilator  His diffusion capacity measurement was moderately decreased at 57% of predicted  I congratulated on his quitting smoking  I did tell him that if he wanted I could replace his albuterol inhaler with Combivent Respimat  As he is not needing is inhaler without often he will continue just with the ProAir for now  He does not appear to need to Anoro at this time  He stopped that several weeks ago and is doing well    He will call me if he has any problems otherwise follow-up in 1 year

## 2018-09-17 DIAGNOSIS — J43.2 CENTRILOBULAR EMPHYSEMA (HCC): Primary | ICD-10-CM

## 2019-02-20 ENCOUNTER — TRANSCRIBE ORDERS (OUTPATIENT)
Dept: ADMINISTRATIVE | Facility: HOSPITAL | Age: 64
End: 2019-02-20

## 2019-02-20 DIAGNOSIS — R06.00 DYSPNEA, UNSPECIFIED TYPE: Primary | ICD-10-CM

## 2019-03-13 ENCOUNTER — HOSPITAL ENCOUNTER (OUTPATIENT)
Dept: NON INVASIVE DIAGNOSTICS | Facility: HOSPITAL | Age: 64
Discharge: HOME | End: 2019-03-13
Attending: INTERNAL MEDICINE
Payer: COMMERCIAL

## 2019-03-13 ENCOUNTER — HOSPITAL ENCOUNTER (OUTPATIENT)
Dept: RADIOLOGY | Facility: HOSPITAL | Age: 64
Discharge: HOME | End: 2019-03-13
Attending: INTERNAL MEDICINE
Payer: COMMERCIAL

## 2019-03-13 DIAGNOSIS — R06.00 DYSPNEA, UNSPECIFIED TYPE: ICD-10-CM

## 2019-03-13 PROCEDURE — 78452 HT MUSCLE IMAGE SPECT MULT: CPT

## 2019-03-13 PROCEDURE — 93017 CV STRESS TEST TRACING ONLY: CPT

## 2019-03-13 PROCEDURE — A9502 TC99M TETROFOSMIN: HCPCS

## 2019-03-13 RX ADMIN — REGADENOSON 0.4 MG: 0.08 INJECTION, SOLUTION INTRAVENOUS at 11:10

## 2019-03-14 LAB
CHEST PAIN STATEMENT: NORMAL
MAX DIASTOLIC BP: 100 MMHG
MAX HEART RATE: 122 BPM
MAX PREDICTED HEART RATE: 157 BPM
MAX. SYSTOLIC BP: 168 MMHG
PROTOCOL NAME: NORMAL
TARGET HR FORMULA: NORMAL
TEST INDICATION: NORMAL
TIME IN EXERCISE PHASE: NORMAL

## 2019-03-14 PROCEDURE — 93016 CV STRESS TEST SUPVJ ONLY: CPT | Performed by: INTERNAL MEDICINE

## 2019-03-14 PROCEDURE — 93018 CV STRESS TEST I&R ONLY: CPT | Performed by: INTERNAL MEDICINE

## 2019-03-14 PROCEDURE — 78452 HT MUSCLE IMAGE SPECT MULT: CPT | Performed by: INTERNAL MEDICINE

## 2019-03-15 ENCOUNTER — APPOINTMENT (OUTPATIENT)
Dept: RADIOLOGY | Facility: CLINIC | Age: 64
End: 2019-03-15
Payer: COMMERCIAL

## 2019-03-15 ENCOUNTER — OFFICE VISIT (OUTPATIENT)
Dept: OBGYN CLINIC | Facility: CLINIC | Age: 64
End: 2019-03-15
Payer: COMMERCIAL

## 2019-03-15 VITALS
DIASTOLIC BLOOD PRESSURE: 75 MMHG | HEIGHT: 67 IN | WEIGHT: 194.4 LBS | HEART RATE: 72 BPM | SYSTOLIC BLOOD PRESSURE: 161 MMHG | BODY MASS INDEX: 30.51 KG/M2

## 2019-03-15 DIAGNOSIS — M25.562 LEFT KNEE PAIN, UNSPECIFIED CHRONICITY: ICD-10-CM

## 2019-03-15 DIAGNOSIS — M25.561 RIGHT KNEE PAIN, UNSPECIFIED CHRONICITY: ICD-10-CM

## 2019-03-15 DIAGNOSIS — M76.52 PATELLAR TENDONITIS OF LEFT KNEE: Primary | ICD-10-CM

## 2019-03-15 PROCEDURE — 99203 OFFICE O/P NEW LOW 30 MIN: CPT | Performed by: ORTHOPAEDIC SURGERY

## 2019-03-15 PROCEDURE — 73562 X-RAY EXAM OF KNEE 3: CPT

## 2019-03-15 NOTE — PROGRESS NOTES
Assessment/Plan:  1  Patellar tendonitis of left knee     2  Left knee pain, unspecified chronicity  XR knee 3 vw left non injury   3  Right knee pain, unspecified chronicity  XR knee 3 vw right non injury       Scribe Attestation    I,:   Carmineronny Rody am acting as a scribe while in the presence of the attending physician :        I,:   Jackie Appiah MD personally performed the services described in this documentation    as scribed in my presence :          Kori Haas has a benign exam today in the office  We did review his x-rays and I explained that there are no abnormal findings  His knee also is very stable on exam with excellent range of motion and strength and no tenderness palpation  I was unable to recreate his symptoms today on exam   I did explain that despite failing to find an obvious recent for his symptoms, it is encouraging that he does not have any obvious orthopedic pathologies  We did discuss treatment strategies today  I encouraged him to use ice and to continue to use over-the-counter NSAID and analgesic medications as needed  I did also encouraged him to use a pillow between his knees as this sometimes can provide relief for patients with knee pain  We did briefly discuss a corticosteroid injection, however I explained that as there is no definitive diagnosis to support an injection, I am hesitant to offer one  I did advise him to follow up with me should he begin to experience daytime pain or if his symptoms increase in frequency or intensity  We will see him back on an as-needed basis  Subjective:   Franko Hernandez is a 61 y o  male who presents today with his wife for evaluation of his left knee  He states that he has experienced nighttime pain in the knee for the last few months  He did have similar but more frequent and intense pain in the knee approximately 15 years ago and received a cortisone injection which did provide relief of his symptoms until a few months ago  At today's visit, he describes a diffuse and mild dull ache about the knee  This pain is localized and does not radiate  He denies any significant pain during the day or with activity  He has attempted to use Advil and Tylenol before bed with only minor relief  He has not had any other treatment interventions  He denies any acute injury or trauma  He denies any distal paresthesias of the lower extremity  He denies any mechanical symptoms about the knee  Review of Systems   Constitutional: Negative for chills, fever and unexpected weight change  HENT: Negative for hearing loss, nosebleeds and sore throat  Eyes: Negative for pain, redness and visual disturbance  Respiratory: Positive for cough and shortness of breath  Negative for wheezing  Cardiovascular: Negative for chest pain, palpitations and leg swelling  Gastrointestinal: Negative for abdominal pain, nausea and vomiting  Endocrine: Negative for polyphagia and polyuria  Genitourinary: Negative for dysuria and hematuria  Musculoskeletal: Positive for arthralgias and myalgias  Negative for joint swelling  See HPI   Skin: Negative for rash and wound  Neurological: Negative for dizziness, numbness and headaches  Psychiatric/Behavioral: Negative for decreased concentration and suicidal ideas  The patient is not nervous/anxious  Past Medical History:   Diagnosis Date    Arthritis     hands    Centrilobular emphysema (HCC)     COPD (chronic obstructive pulmonary disease) (HCC)     BOB (dyspnea on exertion)     URI (upper respiratory infection)        Past Surgical History:   Procedure Laterality Date    COLONOSCOPY N/A 1/19/2017    Procedure: COLONOSCOPY;  Surgeon: Reginaldo Rutherford MD;  Location[facility];   Service:     VASECTOMY         Family History   Problem Relation Age of Onset    Other Mother         cardiac disorder    Alzheimer's disease Mother     Heart disease Father     No Known Problems Sister  No Known Problems Brother     No Known Problems Maternal Aunt     No Known Problems Maternal Uncle     No Known Problems Paternal Aunt     No Known Problems Paternal Uncle     No Known Problems Maternal Grandmother     No Known Problems Maternal Grandfather     No Known Problems Paternal Grandmother     No Known Problems Paternal Grandfather     ADD / ADHD Neg Hx     Anesthesia problems Neg Hx     Cancer Neg Hx     Clotting disorder Neg Hx     Collagen disease Neg Hx     Diabetes Neg Hx     Dislocations Neg Hx     Learning disabilities Neg Hx     Neurological problems Neg Hx     Osteoporosis Neg Hx     Rheumatologic disease Neg Hx     Scoliosis Neg Hx     Vascular Disease Neg Hx        Social History     Occupational History    Not on file   Tobacco Use    Smoking status: Former Smoker     Packs/day: 1      Years: 43 00     Pack years: 43 00     Start date: 1973     Last attempt to quit: 12/15/2017     Years since quittin 2    Smokeless tobacco: Never Used    Tobacco comment: quit two days ago 2017   Substance and Sexual Activity    Alcohol use: Yes     Comment: weekends    Drug use: No    Sexual activity: Not on file         Current Outpatient Medications:     albuterol (PROAIR HFA) 90 mcg/act inhaler, Inhale 2 puffs every 4 (four) hours as needed for wheezing or shortness of breath, Disp: 1 Inhaler, Rfl: 5    Multiple Vitamins-Minerals (CENTRUM ADULTS PO), Take 1 tablet by mouth daily, Disp: , Rfl:     umeclidinium-vilanterol (ANORO ELLIPTA) 62 5-25 MCG/INH inhaler, Inhale 1 puff daily, Disp: 1 Inhaler, Rfl: 5    No Known Allergies    Objective:  Vitals:    03/15/19 1118   BP: 161/75   Pulse: 72       Right Knee Exam     Tenderness   The patient is experiencing no tenderness       Range of Motion   Extension: 0   Flexion: 140     Tests   Afshin:  Medial - negative Lateral - negative  Varus: negative Valgus: negative  Drawer:  Anterior - negative    Posterior - negative    Other   Erythema: absent  Scars: absent  Sensation: normal  Pulse: present  Swelling: none  Effusion: no effusion present      Left Knee Exam     Tenderness   The patient is experiencing no tenderness  Range of Motion   Extension: 0   Flexion: 140     Tests   Afshin:  Medial - negative Lateral - negative  Varus: negative Valgus: negative  Drawer:  Anterior - negative     Posterior - negative    Other   Erythema: absent  Scars: absent  Sensation: normal  Pulse: present  Swelling: none  Effusion: no effusion present      Left Hip Exam     Range of Motion   External rotation: 50   Internal rotation: 30     Muscle Strength   Flexion: 5/5           Observations   Left Knee   Negative for effusion  Right Knee   Negative for effusion  Physical Exam   Constitutional: He is oriented to person, place, and time  He appears well-developed  HENT:   Head: Normocephalic and atraumatic  Eyes: Conjunctivae are normal    Neck: Neck supple  Cardiovascular: Intact distal pulses  Pulmonary/Chest: Effort normal  No respiratory distress  Musculoskeletal:        Right knee: He exhibits no effusion  Left knee: He exhibits no effusion  Neurological: He is alert and oriented to person, place, and time  Skin: Skin is warm and dry  Psychiatric: He has a normal mood and affect  His behavior is normal    Vitals reviewed  I have personally reviewed pertinent films in PACS and my interpretation is as follows:  Bilateral knee x-rays obtained on 03/15/2019 show no acute fracture, dislocation, or other osseous abnormalities

## 2019-03-29 DIAGNOSIS — J43.2 CENTRILOBULAR EMPHYSEMA (HCC): ICD-10-CM

## 2019-04-02 RX ORDER — UMECLIDINIUM BROMIDE AND VILANTEROL TRIFENATATE 62.5; 25 UG/1; UG/1
POWDER RESPIRATORY (INHALATION)
Qty: 60 EACH | Refills: 5 | Status: SHIPPED | OUTPATIENT
Start: 2019-04-02 | End: 2019-04-12 | Stop reason: SDUPTHER

## 2019-04-12 ENCOUNTER — OFFICE VISIT (OUTPATIENT)
Dept: PULMONOLOGY | Facility: MEDICAL CENTER | Age: 64
End: 2019-04-12
Payer: COMMERCIAL

## 2019-04-12 VITALS
HEART RATE: 74 BPM | BODY MASS INDEX: 30.45 KG/M2 | WEIGHT: 194 LBS | OXYGEN SATURATION: 97 % | HEIGHT: 67 IN | RESPIRATION RATE: 12 BRPM | TEMPERATURE: 97.1 F | SYSTOLIC BLOOD PRESSURE: 138 MMHG | DIASTOLIC BLOOD PRESSURE: 82 MMHG

## 2019-04-12 DIAGNOSIS — Z87.891 FORMER SMOKER: Primary | ICD-10-CM

## 2019-04-12 DIAGNOSIS — G47.10 HYPERSOMNIA: ICD-10-CM

## 2019-04-12 DIAGNOSIS — J43.2 CENTRILOBULAR EMPHYSEMA (HCC): ICD-10-CM

## 2019-04-12 PROCEDURE — 99213 OFFICE O/P EST LOW 20 MIN: CPT | Performed by: INTERNAL MEDICINE

## 2019-04-12 PROCEDURE — 94010 BREATHING CAPACITY TEST: CPT | Performed by: INTERNAL MEDICINE

## 2019-04-13 PROBLEM — G47.10 HYPERSOMNIA: Status: ACTIVE | Noted: 2019-04-13

## 2019-05-21 DIAGNOSIS — G47.10 HYPERSOMNIA: Primary | ICD-10-CM

## 2019-06-04 ENCOUNTER — OFFICE VISIT (OUTPATIENT)
Dept: PODIATRY | Facility: CLINIC | Age: 64
End: 2019-06-04
Payer: COMMERCIAL

## 2019-06-04 VITALS
WEIGHT: 194 LBS | SYSTOLIC BLOOD PRESSURE: 138 MMHG | RESPIRATION RATE: 16 BRPM | HEIGHT: 67 IN | BODY MASS INDEX: 30.45 KG/M2 | DIASTOLIC BLOOD PRESSURE: 82 MMHG | HEART RATE: 74 BPM

## 2019-06-04 DIAGNOSIS — M20.11 HALLUX VALGUS OF RIGHT FOOT: ICD-10-CM

## 2019-06-04 DIAGNOSIS — B07.0 PLANTAR WARTS: Primary | ICD-10-CM

## 2019-06-04 DIAGNOSIS — R20.2 PARESTHESIA OF RIGHT LOWER EXTREMITY: ICD-10-CM

## 2019-06-04 DIAGNOSIS — M79.671 RIGHT FOOT PAIN: ICD-10-CM

## 2019-06-04 PROCEDURE — 99213 OFFICE O/P EST LOW 20 MIN: CPT | Performed by: PODIATRIST

## 2019-06-04 PROCEDURE — 17110 DESTRUCTION B9 LES UP TO 14: CPT | Performed by: PODIATRIST

## 2019-06-28 ENCOUNTER — HOSPITAL ENCOUNTER (OUTPATIENT)
Dept: SLEEP CENTER | Facility: CLINIC | Age: 64
Discharge: HOME | End: 2019-06-28
Payer: COMMERCIAL

## 2019-06-28 DIAGNOSIS — G47.10 HYPERSOMNIA: ICD-10-CM

## 2019-06-28 PROCEDURE — 95810 POLYSOM 6/> YRS 4/> PARAM: CPT | Performed by: INTERNAL MEDICINE

## 2019-06-28 PROCEDURE — 95810 POLYSOM 6/> YRS 4/> PARAM: CPT

## 2019-07-03 DIAGNOSIS — G47.33 OBSTRUCTIVE SLEEP APNEA: Primary | ICD-10-CM

## 2019-07-03 NOTE — PROGRESS NOTES
He had diagnostic in-lab sleep study which showed mild BEVERLY discuss compliance data with her  Discussed results with his wife Da Check  She states patient will be agreeable to auto CPAP  I will order auto CPAP 5-15 cm water patient will have follow-up the compliance visit after he has had CPAP machine for 30 days    I did discuss compliance criteria  with her

## 2019-07-15 ENCOUNTER — OFFICE VISIT (OUTPATIENT)
Dept: PULMONOLOGY | Facility: MEDICAL CENTER | Age: 64
End: 2019-07-15
Payer: COMMERCIAL

## 2019-07-15 VITALS
HEIGHT: 67 IN | BODY MASS INDEX: 29.66 KG/M2 | SYSTOLIC BLOOD PRESSURE: 150 MMHG | TEMPERATURE: 97.7 F | HEART RATE: 72 BPM | RESPIRATION RATE: 16 BRPM | OXYGEN SATURATION: 95 % | DIASTOLIC BLOOD PRESSURE: 92 MMHG | WEIGHT: 189 LBS

## 2019-07-15 DIAGNOSIS — J43.2 CENTRILOBULAR EMPHYSEMA (HCC): Primary | ICD-10-CM

## 2019-07-15 PROCEDURE — 99214 OFFICE O/P EST MOD 30 MIN: CPT | Performed by: PHYSICIAN ASSISTANT

## 2019-07-15 RX ORDER — ALBUTEROL SULFATE 90 UG/1
2 AEROSOL, METERED RESPIRATORY (INHALATION) EVERY 4 HOURS PRN
Qty: 1 INHALER | Refills: 6 | Status: SHIPPED | OUTPATIENT
Start: 2019-07-15 | End: 2020-03-27 | Stop reason: SDUPTHER

## 2019-07-15 RX ORDER — PREDNISONE 20 MG/1
TABLET ORAL
Qty: 6 TABLET | Refills: 0 | Status: SHIPPED | OUTPATIENT
Start: 2019-07-15 | End: 2019-07-19

## 2019-07-15 RX ORDER — UMECLIDINIUM BROMIDE AND VILANTEROL TRIFENATATE 62.5; 25 UG/1; UG/1
POWDER RESPIRATORY (INHALATION)
Refills: 6 | COMMUNITY
Start: 2019-07-09 | End: 2019-11-19 | Stop reason: SDUPTHER

## 2019-07-15 NOTE — ASSESSMENT & PLAN NOTE
-patient has moderate COPD  -April/2019:  Ratio 59 percent, FEV1 1 95 liters or 62 percent, FVC 3 29 liters 70 percent  -can trial short prednisone taper 20 milligrams x4, then 10 milligrams x4  -can up transition to triple therapy with Trelegy  -can later consider pulmonary rehab is still having issues with dyspnea, would follow up with imaging otherwise 1st

## 2019-07-15 NOTE — LETTER
July 15, 2019     Patient: Lamar Cai   YOB: 1955   Date of Visit: 7/15/2019       To Whom it May Concern:    Juana Graff is under my professional care  He was seen in my office on 7/15/2019  He may return to work on 8/5/19  If you have any questions or concerns, please don't hesitate to call           Sincerely,          Pallavi Burciaga PA-C        CC: No Recipients

## 2019-07-15 NOTE — ASSESSMENT & PLAN NOTE
-patient has a history of mild BEVERLY  -he was ordered auto CPAP to [address]  -is pending delivery of his CPAP machine  -advised the patient to call within 1 week if he has not received his CPAP machine

## 2019-07-15 NOTE — PATIENT INSTRUCTIONS
Dyspnea/fatigue:  -likely secondary to COPD and BEVERLY combination    Moderate COPD:  -Stop Anoro  -Start Trelegy 1 Puff Daily  -refilled proair  -short steroid taper 20mg / day x 4 days then 10 mg / day x 4 days  -will consider pulmonary rehab if not improving    BEVERLY:  -start CPAP  -please call within 1 week if you have not heard from Hannah Herndon

## 2019-07-16 NOTE — PROGRESS NOTES
Assessment/Plan:    Problem List Items Addressed This Visit        Respiratory    Centrilobular emphysema ([address] ) - Primary     -patient has moderate COPD  -April/2019:  Ratio 59 percent, FEV1 1 95 liters or 62 percent, FVC 3 29 liters 70 percent  -can trial short prednisone taper 20 milligrams x4, then 10 milligrams x4  -can up transition to triple therapy with Trelegy  -can later consider pulmonary rehab is still having issues with dyspnea, would follow up with imaging otherwise 1st           Relevant Medications    ANORO ELLIPTA 62 5-25 MCG/INH inhaler    fluticasone-umeclidinium-vilanterol (TRELEGY ELLIPTA) 100-62 5-25 MCG/INH inhaler    albuterol (PROAIR HFA) 90 mcg/act inhaler    predniSONE 20 mg tablet            Return in about 3 months (around 10/15/2019)  All questions are answered to the patient's satisfaction and understanding  He verbalizes understanding  He is encouraged to call with any further questions or concerns  Portions of the record may have been created with voice recognition software  Occasional wrong word or "sound a like" substitutions may have occurred due to the inherent limitations of voice recognition software  Read the chart carefully and recognize, using context, where substitutions have occurred  Electronically Signed by Herminio Sena PA-C    ______________________________________________________________________    Chief Complaint:   Chief Complaint   Patient presents with    Fatigue     Waiting for CPAP from [address]    Breathing Problem       Patient ID: Michael Jean is a 61 y o  y o  male has a past medical history of Arthritis, Centrilobular emphysema ([address] ), COPD (chronic obstructive pulmonary disease) ([address] ), BOB (dyspnea on exertion), and URI (upper respiratory infection)  7/15/2019  Patient presents today for follow-up visit  He complains of chronic and ongoing fatigue as well as some shortness of breath with exercise which has been ongoing for some time    He has previously been on Anoro and otherwise has been stable  Occasionally has some shortness of breath while working  He works as a  and becomes short of breath sometimes with working with chemicals that he uses to both the floors at work  Otherwise does not complain of significant dyspnea  More so complains of chronic fatigue  He reports that he chronically feels sleepy all the time  He has an ESS of 8  We discussed the possibility that the patient may be experiencing some mild exacerbation symptoms  We discussed utilizing short course of prednisone 20 mg for 4 days followed by 10 mg for 4 days with the initiation of ICS/LABA/LAMA therapy to be up titrated from ICS/LABA  Discussed that we will discontinue Anoro and start Trelegy  Further follow-up can discuss pulmonary rehab if necessary in still experiencing symptoms  May otherwise be global deconditioning  Will follow up in approximately 3 months    This patient is also pending receipt of his CPAP from [address] which also can explain his chronic and ongoing fatigue  Discussed that 3 months follow-up we can have the patient optimized on both his COPD and have a full 30 day compliance  For follow-up and readdress symptoms  HPI    Review of Systems   Constitutional: Positive for fatigue  Negative for activity change and appetite change  HENT: Negative for dental problem, drooling, postnasal drip, rhinorrhea, sinus pressure, sinus pain and sneezing  Eyes: Negative for visual disturbance  Respiratory: Positive for shortness of breath  Negative for apnea  Cardiovascular: Negative for chest pain and leg swelling  Gastrointestinal: Negative for abdominal pain, diarrhea, nausea and vomiting  Endocrine: Negative for cold intolerance and heat intolerance  Genitourinary: Negative for dysuria and flank pain  Musculoskeletal: Negative for arthralgias, joint swelling and myalgias  Skin: Negative for color change and rash  Allergic/Immunologic: Negative for environmental allergies  Neurological: Negative for dizziness and syncope  Hematological: Does not bruise/bleed easily  Psychiatric/Behavioral: Negative for confusion  The following portions of the patient's history were reviewed and updated as appropriate: allergies, current medications, past family history, past medical history, past social history, past surgical history and problem list     Smoking history: He reports that he quit smoking about 19 months ago  He started smoking about 46 years ago  He has a 43 00 pack-year smoking history  He has never used smokeless tobacco   Social history: He reports that he quit smoking about 19 months ago  He started smoking about 46 years ago  He has a 43 00 pack-year smoking history  He has never used smokeless tobacco  He reports that he drinks alcohol  He reports that he does not use drugs  Past Medical History:   Diagnosis Date    Arthritis     hands    Centrilobular emphysema (HCC)     COPD (chronic obstructive pulmonary disease) (HCC)     BOB (dyspnea on exertion)     URI (upper respiratory infection)      Past Surgical History:   Procedure Laterality Date    COLONOSCOPY N/A 1/19/2017    Procedure: COLONOSCOPY;  Surgeon: Berna Gomez MD;  Location: [facility] GI [facility];   Service:     VASECTOMY       Family History   Problem Relation Age of Onset    Other Mother         cardiac disorder    Alzheimer's disease Mother     Heart disease Father     No Known Problems Sister     No Known Problems Brother     No Known Problems Maternal Aunt     No Known Problems Maternal Uncle     No Known Problems Paternal Aunt     No Known Problems Paternal Uncle     No Known Problems Maternal Grandmother     No Known Problems Maternal Grandfather     No Known Problems Paternal Grandmother     No Known Problems Paternal Grandfather     ADD / ADHD Neg Hx     Anesthesia problems Neg Hx     Cancer Neg Hx     Clotting disorder Neg Hx     Collagen disease Neg Hx     Diabetes Neg Hx     Dislocations Neg Hx     Learning disabilities Neg Hx     Neurological problems Neg Hx     Osteoporosis Neg Hx     Rheumatologic disease Neg Hx     Scoliosis Neg Hx     Vascular Disease Neg Hx        There is no immunization history on file for this patient  Current Outpatient Medications   Medication Sig Dispense Refill    albuterol (PROAIR HFA) 90 mcg/act inhaler Inhale 2 puffs every 4 (four) hours as needed for wheezing or shortness of breath 1 Inhaler 6    ANORO ELLIPTA 62 5-25 MCG/INH inhaler   6    fluticasone-umeclidinium-vilanterol (TRELEGY ELLIPTA) 100-62 5-25 MCG/INH inhaler Inhale 1 puff daily Rinse mouth after use  1 Inhaler 6    Multiple Vitamins-Minerals (CENTRUM ADULTS PO) Take 1 tablet by mouth daily      nicotine (NICOTROL) 10 MG inhaler Use 1 cartridge every hour as needed do not exceed 10 day (Patient not taking: Reported on 7/15/2019) 168 each 3    predniSONE 20 mg tablet Take 1 tablet (20 mg total) by mouth daily AND 0 5 tablets (10 mg total) daily  Do all this for 4 days  6 tablet 0     No current facility-administered medications for this visit  Allergies: Patient has no known allergies  Objective:  Vitals:    07/15/19 1306   BP: 150/92   BP Location: Right arm   Patient Position: Sitting   Cuff Size: Standard   Pulse: 72   Resp: 16   Temp: 97 7 °F (36 5 °C)   TempSrc: Tympanic   SpO2: 95%   Weight: 85 7 kg (189 lb)   Height: 5' 7" (1 702 m)   Oxygen Therapy  SpO2: 95 %    Wt Readings from Last 3 Encounters:   07/15/19 85 7 kg (189 lb)   06/04/19 88 kg (194 lb)   04/12/19 88 kg (194 lb)     Body mass index is 29 6 kg/m²  Physical Exam   Constitutional: He is oriented to person, place, and time  He appears well-developed and well-nourished  No distress  HENT:   Head: Normocephalic and atraumatic  Mouth/Throat: No oropharyngeal exudate  Eyes: Pupils are equal, round, and reactive to light     Neck: Normal range of motion  Neck supple  No JVD present  No tracheal deviation present  No thyromegaly present  Cardiovascular: Exam reveals no gallop and no friction rub  No murmur heard  Pulmonary/Chest: Effort normal and breath sounds normal  No stridor  No respiratory distress  He has no wheezes  He has no rales  He exhibits no tenderness  Abdominal: He exhibits no distension  There is no tenderness  There is no guarding  Musculoskeletal: Normal range of motion  He exhibits no edema  Neurological: He is alert and oriented to person, place, and time  Skin: Skin is warm and dry  No rash noted  No erythema  Psychiatric: He has a normal mood and affect  Lab Review:   No visits with results within 2 Month(s) from this visit  Latest known visit with results is:   Hospital Outpatient Visit on 03/13/2019   Component Date Value    Protocol Name 03/13/2019 Andie Christian Time In Exercise Phase 03/13/2019 00:05:13     MAX  SYSTOLIC BP 46/00/5863 543     Max Diastolic Bp 08/96/3258 842     Max Heart Rate 03/13/2019 122     Max Predicted Heart Rate 03/13/2019 157     Reason for Termination 03/13/2019                      Value:Fatigue  Dyspnea,protocol changed to [place]      Test Indication 03/13/2019 Dyspnea     Target Hr Formular 03/13/2019 (220 - Age)*100%     Chest Pain Statement 03/13/2019 none        Diagnostics:  No orders to display     I have personally reviewed pertinent reports  and I have personally reviewed pertinent films in PACS  none pertinent  Office Spirometry Results:         No results found

## 2019-11-19 ENCOUNTER — OFFICE VISIT (OUTPATIENT)
Dept: PULMONOLOGY | Facility: MEDICAL CENTER | Age: 64
End: 2019-11-19
Payer: COMMERCIAL

## 2019-11-19 VITALS
RESPIRATION RATE: 16 BRPM | BODY MASS INDEX: 30.45 KG/M2 | DIASTOLIC BLOOD PRESSURE: 70 MMHG | WEIGHT: 194 LBS | SYSTOLIC BLOOD PRESSURE: 138 MMHG | HEART RATE: 78 BPM | HEIGHT: 67 IN | OXYGEN SATURATION: 95 % | TEMPERATURE: 98.7 F

## 2019-11-19 DIAGNOSIS — G47.33 OBSTRUCTIVE SLEEP APNEA: ICD-10-CM

## 2019-11-19 DIAGNOSIS — Z23 NEED FOR DIPHTHERIA, TETANUS, ACELLULAR PERTUSSIS AND HAEMOPHILUS INFLUENZAE VACCINE: Primary | ICD-10-CM

## 2019-11-19 DIAGNOSIS — Z87.891 FORMER SMOKER: ICD-10-CM

## 2019-11-19 DIAGNOSIS — J43.2 CENTRILOBULAR EMPHYSEMA (HCC): ICD-10-CM

## 2019-11-19 PROBLEM — G47.10 HYPERSOMNIA: Status: RESOLVED | Noted: 2019-04-13 | Resolved: 2019-11-19

## 2019-11-19 PROCEDURE — 90471 IMMUNIZATION ADMIN: CPT

## 2019-11-19 PROCEDURE — 90686 IIV4 VACC NO PRSV 0.5 ML IM: CPT

## 2019-11-19 PROCEDURE — 99214 OFFICE O/P EST MOD 30 MIN: CPT | Performed by: NURSE PRACTITIONER

## 2019-11-19 RX ORDER — AMLODIPINE AND OLMESARTAN MEDOXOMIL 5; 40 MG/1; MG/1
TABLET ORAL DAILY
Refills: 3 | COMMUNITY
Start: 2019-10-28

## 2019-11-19 RX ORDER — AMLODIPINE BESYLATE 5 MG/1
TABLET ORAL
Refills: 3 | COMMUNITY
Start: 2019-10-23 | End: 2020-08-21 | Stop reason: ALTCHOICE

## 2019-11-19 RX ORDER — UMECLIDINIUM BROMIDE AND VILANTEROL TRIFENATATE 62.5; 25 UG/1; UG/1
1 POWDER RESPIRATORY (INHALATION) DAILY
Qty: 1 INHALER | Refills: 5 | Status: SHIPPED | OUTPATIENT
Start: 2019-11-19 | End: 2020-07-01

## 2019-11-19 NOTE — ASSESSMENT & PLAN NOTE
Patient has history of centrilobular emphysema  He smoked most of his adult life  His last PFT was done as April 2019  Forced vital capacity was 3 29 L or 78% of predicted, FEV1 was 1 95 L or 62% and obstruction ratio 59% moderate airway obstruction was noted  On flow volume loop  Patient currently is using Anoro 1 puff daily  This is an inhaled anticholinergic and long-acting beta agonist   He rarely needs rescue inhalation and currently is stable  Patient did receive influenza vaccine today

## 2019-11-19 NOTE — ASSESSMENT & PLAN NOTE
Patient is a former smoker  He quit smoking 2 years ago  He smoked over a pack of cigarettes per day  He currently is vaping and I did review the health hazards of a being  He is well aware health disadvantages    I did discuss low radiation screening CT of chest   He defers at this time of will consider it in the future

## 2019-11-19 NOTE — ASSESSMENT & PLAN NOTE
Patient has a history of mild-to-moderate obstructive sleep apnea  He had his diagnostic test done June 28, 2018  He had mild oxygen desaturation  Apneic hypopnea index was 5 5 and increased to 14 9 during REM sleep  Supine position AHI increased 24 5  Compliance data reveals as follows: For last 30 days he used the machine 28 days or 94% and average usage was 5 hours 27 minutes usage over 4 hours was 74% in AHI was reduced to 2 5  Events per hour  Patient does not want his CPAP  He has been using Tylenol p m  He said this is helpful in his sleep  I did review the advantages of continuation but he defers at this point he would like it discontinued

## 2019-11-19 NOTE — PATIENT INSTRUCTIONS

## 2020-01-28 ENCOUNTER — OFFICE VISIT (OUTPATIENT)
Dept: GASTROENTEROLOGY | Facility: CLINIC | Age: 65
End: 2020-01-28
Payer: COMMERCIAL

## 2020-01-28 VITALS
DIASTOLIC BLOOD PRESSURE: 78 MMHG | HEIGHT: 67 IN | WEIGHT: 195.25 LBS | TEMPERATURE: 98.5 F | SYSTOLIC BLOOD PRESSURE: 140 MMHG | BODY MASS INDEX: 30.64 KG/M2 | HEART RATE: 85 BPM

## 2020-01-28 DIAGNOSIS — K64.8 INTERNAL HEMORRHOIDS: Primary | ICD-10-CM

## 2020-01-28 DIAGNOSIS — K62.5 RECTAL BLEEDING: ICD-10-CM

## 2020-01-28 PROCEDURE — 99244 OFF/OP CNSLTJ NEW/EST MOD 40: CPT | Performed by: INTERNAL MEDICINE

## 2020-01-28 RX ORDER — HYDROCORTISONE ACETATE 25 MG/1
25 SUPPOSITORY RECTAL
Qty: 14 SUPPOSITORY | Refills: 0 | Status: SHIPPED | OUTPATIENT
Start: 2020-01-28 | End: 2020-08-21 | Stop reason: ALTCHOICE

## 2020-01-28 NOTE — LETTER
January 28, 2020     Scar Medina MD  [address] [address]    Patient: Casey Lund   YOB: 1955   Date of Visit: 1/28/2020       Dear Dr Maria Elena Mathew:    Thank you for referring Lauryn Conde to me for evaluation  Below are my notes for this consultation  If you have questions, please do not hesitate to call me  I look forward to following your patient along with you  Sincerely,        Raeann Kingston MD        CC: No Recipients  Raeann Kingston MD  1/28/2020 12:08 PM  Sign at close encounter  Consultation - [address] [facility]  Casey Lund 59 y o  male MRN: [id number]  Unit/Bed#:  Encounter: [id number]        Consults    ASSESSMENT/PLAN:     1  Rectal bleeding is likely hemorrhoidal as it occurs with rectal irritation and pain-exam is notable for internal hemorrhoids and external skin tag  He underwent colonoscopy less than a year ago which was notable for colon polyps as per patient report  I do not have these results  -will obtain the results of the previous colonoscopy  No need for repeat colonoscopy at this time as symptoms have been ongoing for 2-3 years even prior to last exam   -discussed with patient that he needs to start on Sitz bath, Anusol suppository for 14 days at nighttime  -he needs to start taking Metamucil 1 tbsp on daily basis and increase fiber intake to at least 30 g daily  Increase water intake to 64 oz daily  Avoid bearing down   -obtain results of previous colonoscopy  -follow-up as needed  ______________________________________________________________________    Reason for Consult / Principal Problem: [unfilled]    HPI: Casey Lund is a 59y o  year old male with history of COPD, obstructive sleep apnea, presents for evaluation of rectal bleeding and hemorrhoids    Patient reports that he has had intermittent episodes of rectal bleeding with bright red blood per rectum and irritation within the rectal area for at least 2-3 years  Patient reports that he was seen by [facility] and underwent colonoscopy last year  He reports that he was told he had 1 polyp and was recommended a repeat colonoscopy at 5 year interval   He denies any change in bowel habits, abdominal pain, unintentional weight loss and denies any upper GI symptoms  Patient reports that he has had hemorrhoidectomy in the past with removal of external hemorrhoids  On rectal exam, he has a external skin tags along with several small internal hemorrhoids on palpation  No blood is noted, normal sphincter tone  No obvious external hemorrhoids are seen  Review of Systems: The remainder of the review of systems was negative except for the pertinent positives noted in HPI  Historical Information   Past Medical History:   Diagnosis Date    Arthritis     hands    Centrilobular emphysema (HCC)     COPD (chronic obstructive pulmonary disease) (HCC)     BOB (dyspnea on exertion)     URI (upper respiratory infection)      Past Surgical History:   Procedure Laterality Date    COLONOSCOPY N/A 2017    Procedure: COLONOSCOPY;  Surgeon: Thi Turner MD;  Location: [place] [address] GI LAB;   Service:     VASECTOMY       Social History   Social History     Substance and Sexual Activity   Alcohol Use Yes    Comment: weekends     Social History     Substance and Sexual Activity   Drug Use No     Social History     Tobacco Use   Smoking Status Former Smoker    Packs/day: 1 00    Years: 43 00    Pack years: 43 00    Start date: 1973    Last attempt to quit: 12/15/2017    Years since quittin 1   Smokeless Tobacco Never Used   Tobacco Comment    patient vapes occasionally     Family History   Problem Relation Age of Onset    Other Mother         cardiac disorder    Alzheimer's disease Mother     Heart disease Father     No Known Problems Sister     No Known Problems Brother     No Known Problems Maternal Aunt     No Known Problems Maternal Uncle     No Known Problems Paternal Aunt     No Known Problems Paternal Uncle     No Known Problems Maternal Grandmother     No Known Problems Maternal Grandfather     No Known Problems Paternal Grandmother     No Known Problems Paternal Grandfather     ADD / ADHD Neg Hx     Anesthesia problems Neg Hx     Cancer Neg Hx     Clotting disorder Neg Hx     Collagen disease Neg Hx     Diabetes Neg Hx     Dislocations Neg Hx     Learning disabilities Neg Hx     Neurological problems Neg Hx     Osteoporosis Neg Hx     Rheumatologic disease Neg Hx     Scoliosis Neg Hx     Vascular Disease Neg Hx        Meds/Allergies       (Not in a hospital admission)  No current facility-administered medications for this visit  No Known Allergies    Objective     Blood pressure 140/78, pulse 85, temperature 98 5 °F (36 9 °C), height 5' 7" (1 702 m), weight 88 6 kg (195 lb 4 oz)  [unfilled]    PHYSICAL EXAM     GEN: well nourished, well developed, no acute distress  HEENT: anicteric, MMM, no cervical or supraclavicular lymphadenopathy  CV: RRR, no m/r/g  CHEST: CTA b/l, no WRR  ABD: +BS, soft, NT/ND, no hepatosplenomegaly  EXT: no c/c/e  SKIN: no rashes,  NEURO: aaox3    Lab Results:   No visits with results within 1 Day(s) from this visit  Latest known visit with results is:   Hospital Outpatient Visit on 03/13/2019   Component Date Value    Protocol Name 03/13/2019 Sonali Villatoro Time In Exercise Phase 03/13/2019 00:05:13     MAX   SYSTOLIC BP 85/55/5937 446     Max Diastolic Bp 50/01/7488 555     Max Heart Rate 03/13/2019 122     Max Predicted Heart Rate 03/13/2019 157     Reason for Termination 03/13/2019                      Value:Fatigue  Dyspnea,protocol changed to [place]      Test Indication 03/13/2019 Dyspnea     Target Hr Formular 03/13/2019 (220 - Age)*100%     Chest Pain Statement 03/13/2019 none      Imaging Studies: I have personally reviewed pertinent films in PACS

## 2020-01-28 NOTE — PROGRESS NOTES
Consultation - [address] [facility]  Ant Mahajan 59 y o  male MRN: [id number]  Unit/Bed#:  Encounter: [id number]        Consults    ASSESSMENT/PLAN:     1  Rectal bleeding is likely hemorrhoidal as it occurs with rectal irritation and pain-exam is notable for internal hemorrhoids and external skin tag  He underwent colonoscopy less than a year ago which was notable for colon polyps as per patient report  I do not have these results  -will obtain the results of the previous colonoscopy  No need for repeat colonoscopy at this time as symptoms have been ongoing for 2-3 years even prior to last exam   -discussed with patient that he needs to start on Sitz bath, Anusol suppository for 14 days at nighttime  -he needs to start taking Metamucil 1 tbsp on daily basis and increase fiber intake to at least 30 g daily  Increase water intake to 64 oz daily  Avoid bearing down   -obtain results of previous colonoscopy  -follow-up as needed  ______________________________________________________________________    Reason for Consult / Principal Problem: [unfilled]    HPI: Ant Mahajan is a 59y o  year old male with history of COPD, obstructive sleep apnea, presents for evaluation of rectal bleeding and hemorrhoids  Patient reports that he has had intermittent episodes of rectal bleeding with bright red blood per rectum and irritation within the rectal area for at least 2-3 years  Patient reports that he was seen by [facility] and underwent colonoscopy last year  He reports that he was told he had 1 polyp and was recommended a repeat colonoscopy at 5 year interval   He denies any change in bowel habits, abdominal pain, unintentional weight loss and denies any upper GI symptoms  Patient reports that he has had hemorrhoidectomy in the past with removal of external hemorrhoids  On rectal exam, he has a external skin tags along with several small internal hemorrhoids on palpation    No blood is noted, normal sphincter tone  No obvious external hemorrhoids are seen  Review of Systems: The remainder of the review of systems was negative except for the pertinent positives noted in HPI  Historical Information   Past Medical History:   Diagnosis Date    Arthritis     hands    Centrilobular emphysema (HCC)     COPD (chronic obstructive pulmonary disease) (HCC)     BOB (dyspnea on exertion)     URI (upper respiratory infection)      Past Surgical History:   Procedure Laterality Date    COLONOSCOPY N/A 2017    Procedure: COLONOSCOPY;  Surgeon: Zachary Loco MD;  Location: [place] [address] GI LAB;   Service:     VASECTOMY       Social History   Social History     Substance and Sexual Activity   Alcohol Use Yes    Comment: weekends     Social History     Substance and Sexual Activity   Drug Use No     Social History     Tobacco Use   Smoking Status Former Smoker    Packs/day: 1 00    Years: 43 00    Pack years: 43 00    Start date: 1973    Last attempt to quit: 12/15/2017    Years since quittin 1   Smokeless Tobacco Never Used   Tobacco Comment    patient vapes occasionally     Family History   Problem Relation Age of Onset    Other Mother         cardiac disorder    Alzheimer's disease Mother     Heart disease Father     No Known Problems Sister     No Known Problems Brother     No Known Problems Maternal Aunt     No Known Problems Maternal Uncle     No Known Problems Paternal Aunt     No Known Problems Paternal Uncle     No Known Problems Maternal Grandmother     No Known Problems Maternal Grandfather     No Known Problems Paternal Grandmother     No Known Problems Paternal Grandfather     ADD / ADHD Neg Hx     Anesthesia problems Neg Hx     Cancer Neg Hx     Clotting disorder Neg Hx     Collagen disease Neg Hx     Diabetes Neg Hx     Dislocations Neg Hx     Learning disabilities Neg Hx     Neurological problems Neg Hx     Osteoporosis Neg Hx     Rheumatologic disease Neg Hx     Scoliosis Neg Hx     Vascular Disease Neg Hx        Meds/Allergies       (Not in a hospital admission)  No current facility-administered medications for this visit  No Known Allergies    Objective     Blood pressure 140/78, pulse 85, temperature 98 5 °F (36 9 °C), height 5' 7" (1 702 m), weight 88 6 kg (195 lb 4 oz)  [unfilled]    PHYSICAL EXAM     GEN: well nourished, well developed, no acute distress  HEENT: anicteric, MMM, no cervical or supraclavicular lymphadenopathy  CV: RRR, no m/r/g  CHEST: CTA b/l, no WRR  ABD: +BS, soft, NT/ND, no hepatosplenomegaly  EXT: no c/c/e  SKIN: no rashes,  NEURO: aaox3    Lab Results:   No visits with results within 1 Day(s) from this visit  Latest known visit with results is:   Hospital Outpatient Visit on 03/13/2019   Component Date Value    Protocol Name 03/13/2019 Wood Malagon Time In Exercise Phase 03/13/2019 00:05:13     MAX   SYSTOLIC BP 87/59/1276 762     Max Diastolic Bp 33/17/9225 731     Max Heart Rate 03/13/2019 122     Max Predicted Heart Rate 03/13/2019 157     Reason for Termination 03/13/2019                      Value:Fatigue  Dyspnea,protocol changed to [address] Test Indication 03/13/2019 Dyspnea     Target Hr Formular 03/13/2019 (220 - Age)*100%     Chest Pain Statement 03/13/2019 none      Imaging Studies: I have personally reviewed pertinent films in PACS

## 2020-03-13 ENCOUNTER — TELEPHONE (OUTPATIENT)
Dept: PULMONOLOGY | Facility: MEDICAL CENTER | Age: 65
End: 2020-03-13

## 2020-03-13 NOTE — TELEPHONE ENCOUNTER
Has questions about the COVID 19 and hos COPD, working in a school  Can you please give him a call?    I couldn't answer the questions they have

## 2020-03-15 ENCOUNTER — TELEPHONE (OUTPATIENT)
Dept: OTHER | Facility: OTHER | Age: 65
End: 2020-03-15

## 2020-03-15 NOTE — TELEPHONE ENCOUNTER
Pt called because he has concerns about the chemicals that are used at his job as a   He suffers from COPD and is concerned for his respiratory health

## 2020-03-16 ENCOUNTER — TELEPHONE (OUTPATIENT)
Dept: PULMONOLOGY | Facility: MEDICAL CENTER | Age: 65
End: 2020-03-16

## 2020-03-27 DIAGNOSIS — J43.2 CENTRILOBULAR EMPHYSEMA (HCC): ICD-10-CM

## 2020-03-27 RX ORDER — ALBUTEROL SULFATE 90 UG/1
2 AEROSOL, METERED RESPIRATORY (INHALATION) EVERY 4 HOURS PRN
Qty: 1 INHALER | Refills: 11 | Status: SHIPPED | OUTPATIENT
Start: 2020-03-27 | End: 2020-07-06 | Stop reason: SDUPTHER

## 2020-03-27 RX ORDER — ALBUTEROL SULFATE 2.5 MG/3ML
2.5 SOLUTION RESPIRATORY (INHALATION) EVERY 4 HOURS PRN
Qty: 180 VIAL | Refills: 6 | Status: SHIPPED | OUTPATIENT
Start: 2020-03-27 | End: 2021-02-09 | Stop reason: SDUPTHER

## 2020-06-22 ENCOUNTER — TELEPHONE (OUTPATIENT)
Dept: GASTROENTEROLOGY | Facility: AMBULARY SURGERY CENTER | Age: 65
End: 2020-06-22

## 2020-06-22 NOTE — TELEPHONE ENCOUNTER
Patients GI provider:  Dr Danay Burns    Number to return call: (  [phone]    Reason for call: Pt still having rectal discomfort and wanted to discuss having the skin tags removed     Scheduled procedure/appointment date if applicable: Apt/procedure   na

## 2020-06-23 ENCOUNTER — OFFICE VISIT (OUTPATIENT)
Dept: GASTROENTEROLOGY | Facility: CLINIC | Age: 65
End: 2020-06-23
Payer: COMMERCIAL

## 2020-06-23 VITALS — HEIGHT: 67 IN | HEART RATE: 83 BPM | TEMPERATURE: 98 F | BODY MASS INDEX: 31.39 KG/M2 | WEIGHT: 200 LBS

## 2020-06-23 DIAGNOSIS — K64.4 EXTERNAL HEMORRHOID: Primary | ICD-10-CM

## 2020-06-23 DIAGNOSIS — K62.5 RECTAL BLEED: ICD-10-CM

## 2020-06-23 PROCEDURE — 99213 OFFICE O/P EST LOW 20 MIN: CPT | Performed by: INTERNAL MEDICINE

## 2020-06-23 RX ORDER — HYDROCORTISONE ACETATE PRAMOXINE HCL 2.5; 1 G/100G; G/100G
CREAM TOPICAL 3 TIMES DAILY
Qty: 30 G | Refills: 1 | Status: SHIPPED | OUTPATIENT
Start: 2020-06-23 | End: 2020-08-21 | Stop reason: ALTCHOICE

## 2020-06-23 NOTE — TELEPHONE ENCOUNTER
Patients GI provider:  Dr Vy Sharma    Number to return call: [phone]    Reason for call: Pt's wife returned call from nurse Guevara    Scheduled procedure/appointment date if applicable: Apt/procedure n/a

## 2020-06-23 NOTE — TELEPHONE ENCOUNTER
Called wife of patient to make aware appointment today with Dr Shakila Sheppard @ [facility] office location @ [phone]

## 2020-06-23 NOTE — TELEPHONE ENCOUNTER
Spoke to wife of patient:  Dr Angela Brandt patient (new patient as of 1/28/20)  Hx: Internal hemorrhoids     Current concerns:  Rectal bleeding (bright red) observed after having BM every 3 days when wiping  Patient is experiencing BM every day without difficulty   Complaints of discomfort with skin tags in rectal area  0/10 pain at this time  Denies external hemorrhoids   Denies constipation (incorporating bran in diet)    Treatment @ home:  Completed steroid cream ointment  Uses sitz bath as needed for discomfort     Discussed appointment with provider for evaluation   Spoke to [facility] for soonest appointment with provider

## 2020-06-28 DIAGNOSIS — J43.2 CENTRILOBULAR EMPHYSEMA (HCC): ICD-10-CM

## 2020-06-29 ENCOUNTER — CONSULT (OUTPATIENT)
Dept: SURGERY | Facility: CLINIC | Age: 65
End: 2020-06-29
Payer: COMMERCIAL

## 2020-06-29 VITALS
TEMPERATURE: 98.4 F | HEART RATE: 76 BPM | BODY MASS INDEX: 31.64 KG/M2 | HEIGHT: 67 IN | SYSTOLIC BLOOD PRESSURE: 122 MMHG | WEIGHT: 201.6 LBS | DIASTOLIC BLOOD PRESSURE: 72 MMHG

## 2020-06-29 DIAGNOSIS — K64.4 SKIN TAG OF PERIANAL REGION: Primary | ICD-10-CM

## 2020-06-29 DIAGNOSIS — Z01.818 PREOPERATIVE EXAMINATION: ICD-10-CM

## 2020-06-29 DIAGNOSIS — K62.5 RECTAL BLEED: ICD-10-CM

## 2020-06-29 PROCEDURE — 99243 OFF/OP CNSLTJ NEW/EST LOW 30: CPT | Performed by: SURGERY

## 2020-06-29 RX ORDER — CEFAZOLIN SODIUM 2 G/50ML
2000 SOLUTION INTRAVENOUS ONCE
Status: CANCELLED | OUTPATIENT
Start: 2020-06-29 | End: 2020-06-29

## 2020-06-29 NOTE — H&P (VIEW-ONLY)
[facility] History and Physical Note:    Assessment:  Perianal skin tag  Symptomatic    Plan:  Excision of perianal skin tag in main OR    Chief Complaint:  I am here for the hemorrhoids    HPI   Patient is a pleasant 60-year-old male who reports perianal pain and bleeding from a skin tag approximately every 2 days  Patient reports no pain initially with the bowel movement  No bleeding in the bowel movement or prolapsing tissue  Patient states afterwards, when he wipes, there is blood on the toilet tissue and the skin tag becomes inflamed and states that way for a couple of days  Patient is on fiber and has a daily, formed soft bowel movement  Patient has a history of banding procedures performed for internal hemorrhoids  No surgical procedures performed    Last colonoscopy October 2019 revealed 1 tubular adenoma  Patient has seen GI twice in the last 6 months for the perianal itching and bleeding  PMH:  Past Medical History:   Diagnosis Date    Arthritis     hands    Centrilobular emphysema (HCC)     COPD (chronic obstructive pulmonary disease) (HCC)     BOB (dyspnea on exertion)     URI (upper respiratory infection)        PSH:  Past Surgical History:   Procedure Laterality Date    COLONOSCOPY N/A 1/19/2017    Procedure: COLONOSCOPY;  Surgeon: Eliana Mccray MD;  Location: [place] [address] GI LAB;   Service:     VASECTOMY         Home Meds:  Current Outpatient Medications on File Prior to Visit   Medication Sig Dispense Refill    albuterol (2 5 mg/3 mL) 0 083 % nebulizer solution Take 1 vial (2 5 mg total) by nebulization every 4 (four) hours as needed for wheezing or shortness of breath 180 vial 6    albuterol (ProAir HFA) 90 mcg/act inhaler Inhale 2 puffs every 4 (four) hours as needed for wheezing or shortness of breath 1 Inhaler 11    amLODIPine (NORVASC) 5 mg tablet   3    amlodipine-olmesartan (ARNOLD) 5-40 MG   3    ANORO ELLIPTA 62 5-25 MCG/INH inhaler Inhale 1 puff daily 1 Inhaler 5    fluticasone-umeclidinium-vilanterol (TRELEGY ELLIPTA) 100-62 5-25 MCG/INH inhaler Inhale 1 puff daily Rinse mouth after use  (Patient not taking: Reported on 2019) 1 Inhaler 6    hydrocortisone (ANUSOL-HC) 25 mg suppository Insert 1 suppository (25 mg total) into the rectum daily at bedtime (Patient not taking: Reported on 2020) 14 suppository 0    hydrocortisone-pramoxine (ANALPRAM-HC) 2 5-1 % rectal cream Apply topically 3 (three) times a day 30 g 1    Multiple Vitamins-Minerals (CENTRUM ADULTS PO) Take 1 tablet by mouth daily      nicotine (NICOTROL) 10 MG inhaler Use 1 cartridge every hour as needed do not exceed 10 day (Patient not taking: Reported on 7/15/2019) 168 each 3     No current facility-administered medications on file prior to visit          Allergies:  No Known Allergies    Social Hx:  Social History     Socioeconomic History    Marital status: /Civil Union     Spouse name: Not on file    Number of children: Not on file    Years of education: Not on file    Highest education level: Not on file   Occupational History    Not on file   Social Needs    Financial resource strain: Not on file    Food insecurity:     Worry: Not on file     Inability: Not on file    Transportation needs:     Medical: Not on file     Non-medical: Not on file   Tobacco Use    Smoking status: Former Smoker     Packs/day: 1 00     Years: 43 00     Pack years: 43 00     Start date: 1973     Last attempt to quit: 12/15/2017     Years since quittin 5    Smokeless tobacco: Never Used    Tobacco comment: patient vapes occasionally   Substance and Sexual Activity    Alcohol use: Yes     Comment: weekends    Drug use: No    Sexual activity: Not on file   Lifestyle    Physical activity:     Days per week: Not on file     Minutes per session: Not on file    Stress: Not on file   Relationships    Social connections:     Talks on phone: Not on file     Gets together: Not on file     Attends Voodoo service: Not on file     Active member of club or organization: Not on file     Attends meetings of clubs or organizations: Not on file     Relationship status: Not on file    Intimate partner violence:     Fear of current or ex partner: Not on file     Emotionally abused: Not on file     Physically abused: Not on file     Forced sexual activity: Not on file   Other Topics Concern    Not on file   Social History Narrative    Not on file        Family Hx:    Family History   Problem Relation Age of Onset    Other Mother         cardiac disorder    Alzheimer's disease Mother     Heart disease Father     No Known Problems Sister     No Known Problems Brother     No Known Problems Maternal Aunt     No Known Problems Maternal Uncle     No Known Problems Paternal Aunt     No Known Problems Paternal Uncle     No Known Problems Maternal Grandmother     No Known Problems Maternal Grandfather     No Known Problems Paternal Grandmother     No Known Problems Paternal Grandfather     ADD / ADHD Neg Hx     Anesthesia problems Neg Hx     Cancer Neg Hx     Clotting disorder Neg Hx     Collagen disease Neg Hx     Diabetes Neg Hx     Dislocations Neg Hx     Learning disabilities Neg Hx     Neurological problems Neg Hx     Osteoporosis Neg Hx     Rheumatologic disease Neg Hx     Scoliosis Neg Hx     Vascular Disease Neg Hx          Review of Systems   Constitutional: Negative  HENT: Negative  Respiratory: Negative  Cardiovascular: Negative  Gastrointestinal: Negative  Genitourinary: Negative  Hematological: Negative  All other systems reviewed and are negative  There were no vitals taken for this visit  Physical Exam   Constitutional: He is oriented to person, place, and time  He appears well-developed and well-nourished  No distress  HENT:   Head: Normocephalic and atraumatic  Eyes: Pupils are equal, round, and reactive to light   EOM are normal  Neck: Normal range of motion  Neck supple  Cardiovascular: Normal rate and regular rhythm  Pulmonary/Chest: Effort normal  No respiratory distress  Abdominal: Soft  He exhibits no distension  In the prone oscar-knife position, patient has a small skin tag 7 o'clock position that is not inflamed at this time  No fissure  Musculoskeletal: Normal range of motion  Neurological: He is alert and oriented to person, place, and time  Nonfocal   Skin: Skin is warm and dry         Pertinent labs reviewed  I personally reviewed his most recent laboratory data  Pertinent images and available reads personally reviewed    Pertinent notes reviewed  I personally reviewed the last 2 notes from Gastroenterology     Kera Villa MD [id number] [facility]  [phone]

## 2020-07-01 RX ORDER — UMECLIDINIUM BROMIDE AND VILANTEROL TRIFENATATE 62.5; 25 UG/1; UG/1
POWDER RESPIRATORY (INHALATION)
Qty: 60 EACH | Refills: 5 | Status: SHIPPED | OUTPATIENT
Start: 2020-07-01 | End: 2020-07-06 | Stop reason: SDUPTHER

## 2020-07-03 ENCOUNTER — TRANSCRIBE ORDERS (OUTPATIENT)
Dept: ADMINISTRATIVE | Facility: HOSPITAL | Age: 65
End: 2020-07-03

## 2020-07-03 ENCOUNTER — APPOINTMENT (OUTPATIENT)
Dept: LAB | Facility: HOSPITAL | Age: 65
End: 2020-07-03
Attending: SPECIALIST
Payer: COMMERCIAL

## 2020-07-03 ENCOUNTER — HOSPITAL ENCOUNTER (OUTPATIENT)
Dept: RADIOLOGY | Facility: HOSPITAL | Age: 65
Discharge: HOME | End: 2020-07-03
Attending: INTERNAL MEDICINE
Payer: COMMERCIAL

## 2020-07-03 DIAGNOSIS — G47.33 OBSTRUCTIVE SLEEP APNEA: ICD-10-CM

## 2020-07-03 DIAGNOSIS — J43.2 CENTRILOBULAR EMPHYSEMA (HCC): ICD-10-CM

## 2020-07-03 DIAGNOSIS — M25.571 PAIN IN JOINT INVOLVING RIGHT ANKLE AND FOOT: Primary | ICD-10-CM

## 2020-07-03 DIAGNOSIS — K62.5 RECTAL BLEED: ICD-10-CM

## 2020-07-03 DIAGNOSIS — Z00.00 ROUTINE GENERAL MEDICAL EXAMINATION AT A HEALTH CARE FACILITY: ICD-10-CM

## 2020-07-03 DIAGNOSIS — M25.571 PAIN IN JOINT INVOLVING RIGHT ANKLE AND FOOT: ICD-10-CM

## 2020-07-03 DIAGNOSIS — G47.33 OBSTRUCTIVE SLEEP APNEA: Primary | ICD-10-CM

## 2020-07-03 LAB
ANION GAP SERPL CALCULATED.3IONS-SCNC: 10 MMOL/L (ref 4–13)
BASOPHILS # BLD AUTO: 0.09 THOUSANDS/ΜL (ref 0–0.1)
BASOPHILS NFR BLD AUTO: 1 % (ref 0–1)
BUN SERPL-MCNC: 29 MG/DL (ref 5–25)
CALCIUM SERPL-MCNC: 8.9 MG/DL (ref 8.3–10.1)
CHLORIDE SERPL-SCNC: 105 MMOL/L (ref 100–108)
CO2 SERPL-SCNC: 24 MMOL/L (ref 21–32)
CREAT SERPL-MCNC: 1.32 MG/DL (ref 0.6–1.3)
EOSINOPHIL # BLD AUTO: 0.21 THOUSAND/ΜL (ref 0–0.61)
EOSINOPHIL NFR BLD AUTO: 3 % (ref 0–6)
ERYTHROCYTE [DISTWIDTH] IN BLOOD BY AUTOMATED COUNT: 13 % (ref 11.6–15.1)
GFR SERPL CREATININE-BSD FRML MDRD: 57 ML/MIN/1.73SQ M
GLUCOSE P FAST SERPL-MCNC: 99 MG/DL (ref 65–99)
HCT VFR BLD AUTO: 40.7 % (ref 36.5–49.3)
HGB BLD-MCNC: 13.4 G/DL (ref 12–17)
IMM GRANULOCYTES # BLD AUTO: 0.01 THOUSAND/UL (ref 0–0.2)
IMM GRANULOCYTES NFR BLD AUTO: 0 % (ref 0–2)
LYMPHOCYTES # BLD AUTO: 1.97 THOUSANDS/ΜL (ref 0.6–4.47)
LYMPHOCYTES NFR BLD AUTO: 31 % (ref 14–44)
MCH RBC QN AUTO: 29.4 PG (ref 26.8–34.3)
MCHC RBC AUTO-ENTMCNC: 32.9 G/DL (ref 31.4–37.4)
MCV RBC AUTO: 89 FL (ref 82–98)
MONOCYTES # BLD AUTO: 0.59 THOUSAND/ΜL (ref 0.17–1.22)
MONOCYTES NFR BLD AUTO: 9 % (ref 4–12)
NEUTROPHILS # BLD AUTO: 3.46 THOUSANDS/ΜL (ref 1.85–7.62)
NEUTS SEG NFR BLD AUTO: 56 % (ref 43–75)
NRBC BLD AUTO-RTO: 0 /100 WBCS
PLATELET # BLD AUTO: 312 THOUSANDS/UL (ref 149–390)
PMV BLD AUTO: 11.7 FL (ref 8.9–12.7)
POTASSIUM SERPL-SCNC: 4.8 MMOL/L (ref 3.5–5.3)
RBC # BLD AUTO: 4.56 MILLION/UL (ref 3.88–5.62)
SODIUM SERPL-SCNC: 139 MMOL/L (ref 136–145)
WBC # BLD AUTO: 6.33 THOUSAND/UL (ref 4.31–10.16)

## 2020-07-03 PROCEDURE — 71046 X-RAY EXAM CHEST 2 VIEWS: CPT

## 2020-07-03 PROCEDURE — 85025 COMPLETE CBC W/AUTO DIFF WBC: CPT

## 2020-07-03 PROCEDURE — 93005 ELECTROCARDIOGRAM TRACING: CPT

## 2020-07-03 PROCEDURE — 36415 COLL VENOUS BLD VENIPUNCTURE: CPT

## 2020-07-03 PROCEDURE — 80048 BASIC METABOLIC PNL TOTAL CA: CPT

## 2020-07-03 PROCEDURE — 73600 X-RAY EXAM OF ANKLE: CPT

## 2020-07-03 RX ORDER — ALBUTEROL SULFATE 90 UG/1
2 AEROSOL, METERED RESPIRATORY (INHALATION) EVERY 4 HOURS PRN
Qty: 1 INHALER | Refills: 0 | Status: CANCELLED | OUTPATIENT
Start: 2020-07-03

## 2020-07-04 DIAGNOSIS — K64.4 SKIN TAG OF PERIANAL REGION: ICD-10-CM

## 2020-07-04 PROCEDURE — U0003 INFECTIOUS AGENT DETECTION BY NUCLEIC ACID (DNA OR RNA); SEVERE ACUTE RESPIRATORY SYNDROME CORONAVIRUS 2 (SARS-COV-2) (CORONAVIRUS DISEASE [COVID-19]), AMPLIFIED PROBE TECHNIQUE, MAKING USE OF HIGH THROUGHPUT TECHNOLOGIES AS DESCRIBED BY CMS-2020-01-R: HCPCS

## 2020-07-06 DIAGNOSIS — J43.2 CENTRILOBULAR EMPHYSEMA (HCC): ICD-10-CM

## 2020-07-06 RX ORDER — ALBUTEROL SULFATE 90 UG/1
2 AEROSOL, METERED RESPIRATORY (INHALATION) EVERY 4 HOURS PRN
Qty: 1 INHALER | Refills: 11 | Status: SHIPPED | OUTPATIENT
Start: 2020-07-06

## 2020-07-06 NOTE — PRE-PROCEDURE INSTRUCTIONS
My Surgical Experience    The following information was developed to assist you to prepare for your operation  What do I need to do before coming to the hospital?   Arrange for a responsible person to drive you to and from the hospital    Arrange care for your children at home  Children are not allowed in the recovery areas of the hospital   Plan to wear clothing that is easy to put on and take off  If you are having shoulder surgery, wear a shirt that buttons or zippers in the front  Bathing  o Shower the evening before and the morning of your surgery with an antibacterial soap  Please refer to the Pre Op Showering Instructions for Surgery Patients Sheet   o Remove nail polish and all body piercing jewelry  o Do not shave any body part for at least 24 hours before surgery-this includes face, arms, legs and upper body  Food  o Nothing to eat or drink after midnight the night before your surgery  This includes candy and chewing gum  o Exception: If your surgery is after 12:00pm (noon), you may have clear liquids such as 7-Up®, ginger ale, apple or cranberry juice, Jell-O®, water, or clear broth until 8:00 am  o Do not drink milk or juice with pulp on the morning before surgery  o Do not drink alcohol 24 hours before surgery  Medicine  o Follow instructions you received from your surgeon about which medicines you may take on the day of surgery  o If instructed to take medicine on the morning of surgery, take pills with just a small sip of water  Call your prescribing doctor for specific infroamtion on what to do if you take insulin    What should I bring to the hospital?    Bring:  Altaf Ahr or a walker, if you have them, for foot or knee surgery   A list of the daily medicines, vitamins, minerals, herbals and nutritional supplements you take   Include the dosages of medicines and the time you take them each day   Glasses, dentures or hearing aids   Minimal clothing; you will be wearing hospital sleepwear   Photo ID; required to verify your identity   If you have a Living Will or Power of , bring a copy of the documents   If you have an ostomy, bring an extra pouch and any supplies you use    Do not bring   Medicines or inhalers   Money, valuables or jewelry    What other information should I know about the day of surgery?  Notify your surgeons if you develop a cold, sore throat, cough, fever, rash or any other illness   Report to the Ambulatory Surgical/Same Day Surgery Unit   You will be instructed to stop at Registration only if you have not been pre-registered   Inform your  fi they do not stay that they will be asked by the staff to leave a phone number where they can be reached   Be available to be reached before surgery  In the event the operating room schedule changes, you may be asked to come in earlier or later than expected    *It is important to tell your doctor and others involved in your health care if you are taking or have been taking any non-prescription drugs, vitamins, minerals, herbals or other nutritional supplements  Any of these may interact with some food or medicines and cause a reaction      Pre-Surgery Instructions:   Medication Instructions    albuterol (2 5 mg/3 mL) 0 083 % nebulizer solution Instructed patient per Anesthesia Guidelines   albuterol (ProAir HFA) 90 mcg/act inhaler Instructed patient per Anesthesia Guidelines   amlodipine-olmesartan (ARNOLD) 5-40 MG Instructed patient per Anesthesia Guidelines   ANORO ELLIPTA 62 5-25 MCG/INH inhaler Instructed patient per Anesthesia Guidelines   Multiple Vitamins-Minerals (CENTRUM ADULTS PO) Instructed patient per Anesthesia Guidelines  To take arnold and use inhaler a m  Of surgery

## 2020-07-07 ENCOUNTER — ANESTHESIA EVENT (OUTPATIENT)
Dept: PERIOP | Facility: HOSPITAL | Age: 65
End: 2020-07-07
Payer: COMMERCIAL

## 2020-07-08 ENCOUNTER — HOSPITAL ENCOUNTER (OUTPATIENT)
Facility: HOSPITAL | Age: 65
Setting detail: OUTPATIENT SURGERY
Discharge: HOME | End: 2020-07-08
Attending: SURGERY | Admitting: SURGERY
Payer: COMMERCIAL

## 2020-07-08 ENCOUNTER — ANESTHESIA (OUTPATIENT)
Dept: PERIOP | Facility: HOSPITAL | Age: 65
End: 2020-07-08
Payer: COMMERCIAL

## 2020-07-08 VITALS
HEIGHT: 67 IN | OXYGEN SATURATION: 97 % | TEMPERATURE: 97.2 F | WEIGHT: 201.8 LBS | HEART RATE: 67 BPM | SYSTOLIC BLOOD PRESSURE: 116 MMHG | RESPIRATION RATE: 18 BRPM | BODY MASS INDEX: 31.67 KG/M2 | DIASTOLIC BLOOD PRESSURE: 64 MMHG

## 2020-07-08 DIAGNOSIS — K64.4 SKIN TAG OF PERIANAL REGION: ICD-10-CM

## 2020-07-08 LAB
ATRIAL RATE: 65 BPM
P AXIS: 78 DEGREES
PR INTERVAL: 172 MS
QRS AXIS: -25 DEGREES
QRSD INTERVAL: 88 MS
QT INTERVAL: 408 MS
QTC INTERVAL: 424 MS
SARS-COV-2 RNA RESP QL NAA+PROBE: NEGATIVE
T WAVE AXIS: 54 DEGREES
VENTRICULAR RATE: 65 BPM

## 2020-07-08 PROCEDURE — 46220 EXCISE ANAL EXT TAG/PAPILLA: CPT | Performed by: PHYSICIAN ASSISTANT

## 2020-07-08 PROCEDURE — U0003 INFECTIOUS AGENT DETECTION BY NUCLEIC ACID (DNA OR RNA); SEVERE ACUTE RESPIRATORY SYNDROME CORONAVIRUS 2 (SARS-COV-2) (CORONAVIRUS DISEASE [COVID-19]), AMPLIFIED PROBE TECHNIQUE, MAKING USE OF HIGH THROUGHPUT TECHNOLOGIES AS DESCRIBED BY CMS-2020-01-R: HCPCS | Performed by: SURGERY

## 2020-07-08 PROCEDURE — 88304 TISSUE EXAM BY PATHOLOGIST: CPT | Performed by: PATHOLOGY

## 2020-07-08 PROCEDURE — 93010 ELECTROCARDIOGRAM REPORT: CPT | Performed by: INTERNAL MEDICINE

## 2020-07-08 PROCEDURE — 46220 EXCISE ANAL EXT TAG/PAPILLA: CPT | Performed by: SURGERY

## 2020-07-08 RX ORDER — SODIUM CHLORIDE, SODIUM LACTATE, POTASSIUM CHLORIDE, CALCIUM CHLORIDE 600; 310; 30; 20 MG/100ML; MG/100ML; MG/100ML; MG/100ML
100 INJECTION, SOLUTION INTRAVENOUS CONTINUOUS
Status: DISCONTINUED | OUTPATIENT
Start: 2020-07-08 | End: 2020-07-08 | Stop reason: HOSPADM

## 2020-07-08 RX ORDER — CEFAZOLIN SODIUM 2 G/50ML
2000 SOLUTION INTRAVENOUS ONCE
Status: DISCONTINUED | OUTPATIENT
Start: 2020-07-08 | End: 2020-07-08 | Stop reason: HOSPADM

## 2020-07-08 RX ORDER — MAGNESIUM HYDROXIDE 1200 MG/15ML
LIQUID ORAL AS NEEDED
Status: DISCONTINUED | OUTPATIENT
Start: 2020-07-08 | End: 2020-07-08 | Stop reason: HOSPADM

## 2020-07-08 RX ORDER — ONDANSETRON 2 MG/ML
4 INJECTION INTRAMUSCULAR; INTRAVENOUS ONCE AS NEEDED
Status: DISCONTINUED | OUTPATIENT
Start: 2020-07-08 | End: 2020-07-08 | Stop reason: HOSPADM

## 2020-07-08 RX ORDER — DOCUSATE SODIUM 100 MG/1
100 CAPSULE, LIQUID FILLED ORAL 2 TIMES DAILY
Qty: 28 CAPSULE | Refills: 0 | Status: SHIPPED | OUTPATIENT
Start: 2020-07-08 | End: 2020-08-21 | Stop reason: ALTCHOICE

## 2020-07-08 RX ORDER — FENTANYL CITRATE/PF 50 MCG/ML
50 SYRINGE (ML) INJECTION
Status: DISCONTINUED | OUTPATIENT
Start: 2020-07-08 | End: 2020-07-08 | Stop reason: HOSPADM

## 2020-07-08 RX ORDER — BUPIVACAINE HYDROCHLORIDE AND EPINEPHRINE 5; 5 MG/ML; UG/ML
INJECTION, SOLUTION EPIDURAL; INTRACAUDAL; PERINEURAL AS NEEDED
Status: DISCONTINUED | OUTPATIENT
Start: 2020-07-08 | End: 2020-07-08 | Stop reason: HOSPADM

## 2020-07-08 RX ORDER — LIDOCAINE HYDROCHLORIDE 10 MG/ML
INJECTION, SOLUTION EPIDURAL; INFILTRATION; INTRACAUDAL; PERINEURAL AS NEEDED
Status: DISCONTINUED | OUTPATIENT
Start: 2020-07-08 | End: 2020-07-08 | Stop reason: SURG

## 2020-07-08 RX ORDER — SODIUM CHLORIDE, SODIUM LACTATE, POTASSIUM CHLORIDE, CALCIUM CHLORIDE 600; 310; 30; 20 MG/100ML; MG/100ML; MG/100ML; MG/100ML
125 INJECTION, SOLUTION INTRAVENOUS CONTINUOUS
Status: DISCONTINUED | OUTPATIENT
Start: 2020-07-08 | End: 2020-07-08 | Stop reason: SDUPTHER

## 2020-07-08 RX ORDER — MIDAZOLAM HYDROCHLORIDE 2 MG/2ML
INJECTION, SOLUTION INTRAMUSCULAR; INTRAVENOUS AS NEEDED
Status: DISCONTINUED | OUTPATIENT
Start: 2020-07-08 | End: 2020-07-08 | Stop reason: SURG

## 2020-07-08 RX ORDER — PROPOFOL 10 MG/ML
INJECTION, EMULSION INTRAVENOUS AS NEEDED
Status: DISCONTINUED | OUTPATIENT
Start: 2020-07-08 | End: 2020-07-08 | Stop reason: SURG

## 2020-07-08 RX ORDER — OXYCODONE HYDROCHLORIDE AND ACETAMINOPHEN 5; 325 MG/1; MG/1
1 TABLET ORAL EVERY 4 HOURS PRN
Qty: 20 TABLET | Refills: 0 | Status: SHIPPED | OUTPATIENT
Start: 2020-07-08 | End: 2020-08-21 | Stop reason: ALTCHOICE

## 2020-07-08 RX ORDER — EPHEDRINE SULFATE 50 MG/ML
INJECTION INTRAVENOUS AS NEEDED
Status: DISCONTINUED | OUTPATIENT
Start: 2020-07-08 | End: 2020-07-08 | Stop reason: SURG

## 2020-07-08 RX ORDER — PROPOFOL 10 MG/ML
INJECTION, EMULSION INTRAVENOUS CONTINUOUS PRN
Status: DISCONTINUED | OUTPATIENT
Start: 2020-07-08 | End: 2020-07-08 | Stop reason: SURG

## 2020-07-08 RX ORDER — FENTANYL CITRATE 50 UG/ML
INJECTION, SOLUTION INTRAMUSCULAR; INTRAVENOUS AS NEEDED
Status: DISCONTINUED | OUTPATIENT
Start: 2020-07-08 | End: 2020-07-08 | Stop reason: SURG

## 2020-07-08 RX ORDER — CEFAZOLIN SODIUM 2 G/50ML
SOLUTION INTRAVENOUS AS NEEDED
Status: DISCONTINUED | OUTPATIENT
Start: 2020-07-08 | End: 2020-07-08 | Stop reason: SURG

## 2020-07-08 RX ADMIN — PHENYLEPHRINE HYDROCHLORIDE 100 MCG: 10 INJECTION INTRAVENOUS at 14:39

## 2020-07-08 RX ADMIN — CEFAZOLIN SODIUM 2000 MG: 2 SOLUTION INTRAVENOUS at 14:26

## 2020-07-08 RX ADMIN — SODIUM CHLORIDE, SODIUM LACTATE, POTASSIUM CHLORIDE, AND CALCIUM CHLORIDE 100 ML/HR: .6; .31; .03; .02 INJECTION, SOLUTION INTRAVENOUS at 15:28

## 2020-07-08 RX ADMIN — PHENYLEPHRINE HYDROCHLORIDE 100 MCG: 10 INJECTION INTRAVENOUS at 14:49

## 2020-07-08 RX ADMIN — SODIUM CHLORIDE, SODIUM LACTATE, POTASSIUM CHLORIDE, AND CALCIUM CHLORIDE: .6; .31; .03; .02 INJECTION, SOLUTION INTRAVENOUS at 14:21

## 2020-07-08 RX ADMIN — MIDAZOLAM HYDROCHLORIDE 2 MG: 1 INJECTION, SOLUTION INTRAMUSCULAR; INTRAVENOUS at 14:25

## 2020-07-08 RX ADMIN — PROPOFOL 30 MG: 10 INJECTION, EMULSION INTRAVENOUS at 14:31

## 2020-07-08 RX ADMIN — FENTANYL CITRATE 25 MCG: 50 INJECTION, SOLUTION INTRAMUSCULAR; INTRAVENOUS at 14:33

## 2020-07-08 RX ADMIN — EPHEDRINE SULFATE 10 MG: 50 INJECTION, SOLUTION INTRAVENOUS at 14:49

## 2020-07-08 RX ADMIN — FENTANYL CITRATE 25 MCG: 50 INJECTION, SOLUTION INTRAMUSCULAR; INTRAVENOUS at 14:29

## 2020-07-08 RX ADMIN — FENTANYL CITRATE 50 MCG: 50 INJECTION, SOLUTION INTRAMUSCULAR; INTRAVENOUS at 14:31

## 2020-07-08 RX ADMIN — SODIUM CHLORIDE, SODIUM LACTATE, POTASSIUM CHLORIDE, AND CALCIUM CHLORIDE 125 ML/HR: .6; .31; .03; .02 INJECTION, SOLUTION INTRAVENOUS at 10:05

## 2020-07-08 RX ADMIN — PROPOFOL 100 MCG/KG/MIN: 10 INJECTION, EMULSION INTRAVENOUS at 14:31

## 2020-07-08 RX ADMIN — LIDOCAINE HYDROCHLORIDE 50 MG: 10 INJECTION, SOLUTION EPIDURAL; INFILTRATION; INTRACAUDAL; PERINEURAL at 14:31

## 2020-07-08 RX ADMIN — EPHEDRINE SULFATE 5 MG: 50 INJECTION, SOLUTION INTRAVENOUS at 14:37

## 2020-07-08 RX ADMIN — PROPOFOL 20 MG: 10 INJECTION, EMULSION INTRAVENOUS at 14:41

## 2020-07-08 RX ADMIN — PHENYLEPHRINE HYDROCHLORIDE 100 MCG: 10 INJECTION INTRAVENOUS at 14:46

## 2020-07-08 NOTE — OP NOTE
OPERATIVE REPORT  PATIENT NAME: Priscila Mock    :  1955  MRN: [id number]  Pt Location: [facility] OR ROOM 01    SURGERY DATE: 2020    Surgeon(s) and Role:     * David Magana MD - Primary    Preop Diagnosis:  Skin tag of perianal region [K64 4]    Post-Op Diagnosis Codes:     * Skin tag of perianal region [K64 4]    Procedure(s) (LRB):  EXCISION  BIOPSY LESION/ MASS  ANAL/RECTAL (N/A)    Specimen(s):  ID Type Source Tests Collected by Time Destination   1 : Norah-anal skin tag Tissue Lesion TISSUE EXAM David Magana MD 2020 [id number]        Estimated Blood Loss:   Minimal    Drains:  * No LDAs found *    Anesthesia Type:   IV Sedation with Anesthesia    Operative Indications:  Skin tag of perianal region [K64 4]      Operative Findings:  Perianal Skin tag    Complications:   None    Procedure and Technique:  Excision of perianal skin tag  Patient was taken back to main operating room, placed prone in the OR table, sedated per the anesthesia record and the area was prepped and draped  An ellipse of skin encompassing the skin tag was removed  Skin and subcutaneous tissue was excised with Bovie cautery  Total size of wound was 2 cm long by 1 cm wide by 5 mm deep  The area was copiously irrigated and closed with a running 4 0 Monocryl suture  Local anesthesia was infiltrated at the end of the case  Gauze was placed over the wound      KASEY Banegas was required for technical assistance and retraction     I was present for the entire procedure and A qualified resident physician was not available    Patient Disposition:  PACU     SIGNATURE: David Magana MD  DATE: 2020  TIME: 2:51 PM

## 2020-07-08 NOTE — ANESTHESIA POSTPROCEDURE EVALUATION
Post-Op Assessment Note    CV Status:  Stable  Pain Score: 0    Pain management: adequate     Mental Status:  Alert and awake   Hydration Status:  Euvolemic   PONV Controlled:  Controlled   Airway Patency:  Patent   Post Op Vitals Reviewed: Yes      Staff: CRNA           BP (!) (P) 89/53 (07/08/20 1500)    Temp      Pulse (P) 75 (07/08/20 1500)   Resp (P) 17 (07/08/20 1500)    SpO2 (P) 99 % (07/08/20 1500)

## 2020-07-08 NOTE — INTERVAL H&P NOTE
H&P reviewed  After examining the patient I find no changes in the patients condition since the H&P had been written      Vitals:    07/08/20 0943   BP: 107/59   Pulse: 69   Resp: 18   Temp: 97 5 °F (36 4 °C)   SpO2: 96%

## 2020-07-08 NOTE — ANESTHESIA PREPROCEDURE EVALUATION
Review of Systems/Medical History  Patient summary reviewed  Chart reviewed  No history of anesthetic complications     Cardiovascular  Exercise tolerance (METS): >4,  Hypertension , No dysrhythmias , No angina ,    Pulmonary  COPD , Shortness of breath, Sleep apnea ,        GI/Hepatic  Negative GI/hepatic ROS          Negative  ROS        Endo/Other    Obesity    GYN       Hematology   Musculoskeletal    Arthritis     Neurology    No TIA, No CVA ,    Psychology           Physical Exam    Airway    Mallampati score: II  TM Distance: >3 FB  Neck ROM: full     Dental   Comment: No loose,     Cardiovascular  Rhythm: regular, Rate: normal,     Pulmonary  Breath sounds clear to auscultation,     Other Findings        Anesthesia Plan  ASA Score- 2     Anesthesia Type- IV sedation with anesthesia with ASA Monitors  Additional Monitors:   Airway Plan:         Plan Factors-  Patient did not smoke on day of surgery  Induction- intravenous  Postoperative Plan-     Informed Consent- Anesthetic plan and risks discussed with patient  I personally reviewed this patient with the CRNA  Discussed and agreed on the Anesthesia Plan with the CRNA  Alex Chong

## 2020-07-08 NOTE — DISCHARGE INSTR - AVS FIRST PAGE
1  Keep the perianal incision clean with soap and water daily  Place gauze or feminine hygiene pad over the incision daily  2  Take ibuprofen, 600 mg, 4 times a day regularly  3  Take Percocet in addition to the ibuprofen if you need further pain control    4  Take colace as prescribed

## 2020-07-13 ENCOUNTER — TELEPHONE (OUTPATIENT)
Dept: PULMONOLOGY | Facility: MEDICAL CENTER | Age: 65
End: 2020-07-13

## 2020-07-14 ENCOUNTER — OFFICE VISIT (OUTPATIENT)
Dept: PULMONOLOGY | Facility: MEDICAL CENTER | Age: 65
End: 2020-07-14
Payer: COMMERCIAL

## 2020-07-14 VITALS
BODY MASS INDEX: 31.71 KG/M2 | SYSTOLIC BLOOD PRESSURE: 122 MMHG | OXYGEN SATURATION: 97 % | HEIGHT: 67 IN | HEART RATE: 88 BPM | WEIGHT: 202 LBS | RESPIRATION RATE: 12 BRPM | DIASTOLIC BLOOD PRESSURE: 74 MMHG | TEMPERATURE: 97 F

## 2020-07-14 DIAGNOSIS — J43.2 CENTRILOBULAR EMPHYSEMA (HCC): Primary | ICD-10-CM

## 2020-07-14 DIAGNOSIS — G47.33 OBSTRUCTIVE SLEEP APNEA: ICD-10-CM

## 2020-07-14 LAB — SARS-COV-2 RNA SPEC QL NAA+PROBE: NOT DETECTED

## 2020-07-14 PROCEDURE — 99213 OFFICE O/P EST LOW 20 MIN: CPT | Performed by: INTERNAL MEDICINE

## 2020-07-14 NOTE — PROGRESS NOTES
Assessment/Plan        Problem List Items Addressed This Visit        Respiratory    Centrilobular emphysema ([address] ) - Primary     He does have moderate COPD with FEV1 of 1 95 L or 62% predicted  He will continue with Anoro 1 puff daily as this is working well for him  He does have a rescue albuterol inhaler he can use as needed  Obstructive sleep apnea     Mild obstructive sleep apnea as demonstrated by in lab sleep study done in June 2019  Bakari Em did not want the pursue CPAP therapy  He is not have any excessive daytime somnolence  His goal is to lose weight                  Shortness of Breath (occurs with climbing stairs, doing some yard work) and Cough (occasonal dry  no wheeze)      HPI     Digna Higuera has moderate COPD  He also has mild obstructive sleep apnea  Did not want pursue CPAP therapy  His FEV1 is 1 95 L or 62% of predicted    He does use Anoro 1 puff daily and does have a rescue ProAir inhaler  He denies any excessive daytime somnolence  Not have any cough  He only has mild exertional dyspnea at times  No recent respiratory tract infections  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 soon    He quit smoking in December 2017 but did smoke 1 pack per day for 43 years  Does have occasional cough  Not having any nocturnal dyspnea    He weighed 194 lb when he had his diagnostic sleep study done in June 2019  This showed overall AHI of 5 5 and this increased to 14 9 non REM sleep  His mean O2 saturation was 92% with amanuel of 84%  Past Medical History:   Diagnosis Date    Arthritis     hands    Centrilobular emphysema (HCC)     COPD (chronic obstructive pulmonary disease) (HCC)     BOB (dyspnea on exertion)     Hypertension     URI (upper respiratory infection)        Past Surgical History:   Procedure Laterality Date    COLONOSCOPY N/A 1/19/2017    Procedure: COLONOSCOPY;  Surgeon: Saundra Jenkins MD;  Location: [facility];   Service:  MN EXCISION MULTIPLE EXTERNAL PAPILLAE/TAGS ANUS N/A 7/8/2020    Procedure: EXCISION  BIOPSY LESION/ MASS  ANAL/RECTAL;  Surgeon: Lorena Rouse MD;  Location: [address] [place] [address];  Service: General    VASECTOMY           Current Outpatient Medications:     albuterol (2 5 mg/3 mL) 0 083 % nebulizer solution, Take 1 vial (2 5 mg total) by nebulization every 4 (four) hours as needed for wheezing or shortness of breath, Disp: 180 vial, Rfl: 6    albuterol (ProAir HFA) 90 mcg/act inhaler, Inhale 2 puffs every 4 (four) hours as needed for wheezing or shortness of breath, Disp: 1 Inhaler, Rfl: 11    amlodipine-olmesartan (ARNOLD) 5-40 MG, daily , Disp: , Rfl: 3    docusate sodium (COLACE) 100 mg capsule, Take 1 capsule (100 mg total) by mouth 2 (two) times a day for 14 days, Disp: 28 capsule, Rfl: 0    Multiple Vitamins-Minerals (CENTRUM ADULTS PO), Take 1 tablet by mouth daily, Disp: , Rfl:     oxyCODONE-acetaminophen (PERCOCET) 5-325 mg per tablet, Take 1 tablet by mouth every 4 (four) hours as needed for moderate pain or severe pain for up to 20 dosesMax Daily Amount: 6 tablets (Patient not taking: Reported on 7/20/2020), Disp: 20 tablet, Rfl: 0    umeclidinium-vilanterol (Anoro Ellipta) 62 5-25 MCG/INH inhaler, Inhale 1 puff daily, Disp: 60 each, Rfl: 11    amLODIPine (NORVASC) 5 mg tablet, , Disp: , Rfl: 3    fluticasone-umeclidinium-vilanterol (TRELEGY ELLIPTA) 100-62 5-25 MCG/INH inhaler, Inhale 1 puff daily Rinse mouth after use   (Patient not taking: Reported on 11/19/2019), Disp: 1 Inhaler, Rfl: 6    hydrocortisone (ANUSOL-HC) 25 mg suppository, Insert 1 suppository (25 mg total) into the rectum daily at bedtime (Patient not taking: Reported on 6/23/2020), Disp: 14 suppository, Rfl: 0    hydrocortisone-pramoxine (ANALPRAM-HC) 2 5-1 % rectal cream, Apply topically 3 (three) times a day (Patient not taking: Reported on 6/29/2020), Disp: 30 g, Rfl: 1    nicotine (NICOTROL) 10 MG inhaler, Use 1 cartridge every hour as needed do not exceed 10 day (Patient not taking: Reported on 7/15/2019), Disp: 168 each, Rfl: 3    No Known Allergies    Social History     Tobacco Use    Smoking status: Former Smoker     Packs/day: 1 00     Years: 43 00     Pack years: 43 00     Start date: 1973     Last attempt to quit: 12/15/2017     Years since quittin 6    Smokeless tobacco: Never Used    Tobacco comment: patient vapes occasionally   Substance Use Topics    Alcohol use: Yes     Comment: weekends         Family History   Problem Relation Age of Onset    Other Mother         cardiac disorder    Alzheimer's disease Mother     Heart disease Father     No Known Problems Sister     No Known Problems Brother     No Known Problems Maternal Aunt     No Known Problems Maternal Uncle     No Known Problems Paternal Aunt     No Known Problems Paternal Uncle     No Known Problems Maternal Grandmother     No Known Problems Maternal Grandfather     No Known Problems Paternal Grandmother     No Known Problems Paternal Grandfather     ADD / ADHD Neg Hx     Anesthesia problems Neg Hx     Cancer Neg Hx     Clotting disorder Neg Hx     Collagen disease Neg Hx     Diabetes Neg Hx     Dislocations Neg Hx     Learning disabilities Neg Hx     Neurological problems Neg Hx     Osteoporosis Neg Hx     Rheumatologic disease Neg Hx     Scoliosis Neg Hx     Vascular Disease Neg Hx        Review of Systems   Constitutional: Negative for appetite change, chills, fever and unexpected weight change  HENT: Negative for congestion, ear pain, rhinorrhea and sore throat  Eyes: Negative for discharge and redness  Cardiovascular: Negative for chest pain, palpitations and leg swelling  Gastrointestinal: Negative for abdominal distention, abdominal pain and nausea  Endocrine: Negative for polydipsia and polyphagia  Genitourinary: Negative for dysuria and hematuria  Musculoskeletal: Positive for myalgias   Negative for joint swelling  Skin: Negative for rash  Neurological: Negative for light-headedness and headaches  Vitals:    07/14/20 1115   BP: 122/74   Pulse: 88   Resp: 12   Temp: (!) 97 °F (36 1 °C)   SpO2: 97%           Physical Exam   Constitutional: He is oriented to person, place, and time  He appears well-developed and well-nourished  No distress  HENT:   Head: Normocephalic  Nose: Nose normal    Mouth/Throat: Oropharynx is clear and moist  No oropharyngeal exudate  Eyes: Pupils are equal, round, and reactive to light  Conjunctivae are normal    Neck: No JVD present  No thyromegaly present  Cardiovascular: Normal rate, regular rhythm and normal heart sounds  Pulmonary/Chest: Effort normal    No wheezes crackles or rhonchi   Abdominal: Soft  He exhibits no distension  There is no tenderness  Musculoskeletal:   No edema, cyanosis or clubbing   Neurological: He is alert and oriented to person, place, and time  Skin: Skin is warm and dry  Psychiatric: He has a normal mood and affect         Pulse oximetry testing:  Room air O2 saturation at rest was 97% and after ambulating up and down 2 flights of stairs and 50 ft O2 saturation lowest was 94%         Answers for HPI/ROS submitted by the patient on 7/7/2020   Primary symptoms  Chronicity: chronic  When did you first notice your symptoms?: more than 1 year ago  How often do your symptoms occur?: intermittently  Since you first noticed this problem, how has it changed?: gradually worsening  Do you have shortness of breath that occurs with effort or exertion?: Yes  Do you have ear congestion?: Yes  Do you have heartburn?: Yes  Do you have fatigue?: Yes  Which of the following makes your symptoms worse?: change in weather, climbing stairs, exposure to fumes, strenuous activity  Which of the following makes your symptoms better?: oral steroids, OTC cough suppressant, steroid inhaler  Risk factors for lung disease: occupational exposure

## 2020-07-14 NOTE — LETTER
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

## 2020-07-17 ENCOUNTER — TRANSCRIBE ORDERS (OUTPATIENT)
Dept: ADMINISTRATIVE | Facility: HOSPITAL | Age: 65
End: 2020-07-17

## 2020-07-17 DIAGNOSIS — I73.9 PERIPHERAL VASCULAR DISEASE, UNSPECIFIED (HCC): Primary | ICD-10-CM

## 2020-07-20 ENCOUNTER — OFFICE VISIT (OUTPATIENT)
Dept: SURGERY | Facility: CLINIC | Age: 65
End: 2020-07-20

## 2020-07-20 VITALS
WEIGHT: 204.4 LBS | DIASTOLIC BLOOD PRESSURE: 70 MMHG | TEMPERATURE: 99.4 F | HEIGHT: 67 IN | SYSTOLIC BLOOD PRESSURE: 130 MMHG | BODY MASS INDEX: 32.08 KG/M2 | HEART RATE: 99 BPM

## 2020-07-20 DIAGNOSIS — Z09 POSTOPERATIVE EXAMINATION: Primary | ICD-10-CM

## 2020-07-20 PROCEDURE — 99024 POSTOP FOLLOW-UP VISIT: CPT | Performed by: SURGERY

## 2020-07-20 NOTE — PROGRESS NOTES
Patient is status post excision of a perianal skin tag performed on 8 July  Patient reports mild pain that is controlled  A few drops of bleeding with bowel movements  Tolerating regular diet with normal bowel function  Pathology report:  Final Diagnosis   A  Skin, Cyst/Tag/Debridement, Norah-anal skin tag, excision:  - Acrochordon/skin tag  Incision has opened and there is a small amount of fibrin is exudate as expected  No induration or erythema  Patient counseled to keep area clean with soap and water daily and cover with gauze     Follow-up p r n  Patient will make a minor procedure appointment in the office to remove 2 benign-appearing nevi from his face

## 2020-07-22 ENCOUNTER — TELEPHONE (OUTPATIENT)
Dept: GASTROENTEROLOGY | Facility: MEDICAL CENTER | Age: 65
End: 2020-07-22

## 2020-07-22 NOTE — TELEPHONE ENCOUNTER
----- Message from Nam Juarez MD sent at 7/21/2020  4:17 PM EDT -----  Please inform patient that his blood count is stable  He has a normal hemoglobin despite the rectal bleeding    Please have him call if any questions or concerns

## 2020-07-23 NOTE — ASSESSMENT & PLAN NOTE
He does have moderate COPD with FEV1 of 1 95 L or 62% predicted  He will continue with Anoro 1 puff daily as this is working well for him  He does have a rescue albuterol inhaler he can use as needed

## 2020-07-23 NOTE — ASSESSMENT & PLAN NOTE
Mild obstructive sleep apnea as demonstrated by in lab sleep study done in June 2019  Eunice Mack did not want the pursue CPAP therapy  He is not have any excessive daytime somnolence    His goal is to lose weight

## 2020-07-24 ENCOUNTER — HOSPITAL ENCOUNTER (OUTPATIENT)
Dept: RADIOLOGY | Facility: HOSPITAL | Age: 65
Discharge: HOME | End: 2020-07-24
Attending: INTERNAL MEDICINE
Payer: COMMERCIAL

## 2020-07-24 DIAGNOSIS — I73.9 PERIPHERAL VASCULAR DISEASE, UNSPECIFIED (HCC): ICD-10-CM

## 2020-07-24 PROCEDURE — 93923 UPR/LXTR ART STDY 3+ LVLS: CPT

## 2020-07-24 PROCEDURE — 93925 LOWER EXTREMITY STUDY: CPT

## 2020-07-24 PROCEDURE — 93922 UPR/L XTREMITY ART 2 LEVELS: CPT | Performed by: SURGERY

## 2020-07-24 PROCEDURE — 93925 LOWER EXTREMITY STUDY: CPT | Performed by: SURGERY

## 2020-07-28 ENCOUNTER — PROCEDURE VISIT (OUTPATIENT)
Dept: SURGERY | Facility: CLINIC | Age: 65
End: 2020-07-28
Payer: COMMERCIAL

## 2020-07-28 VITALS
TEMPERATURE: 98.3 F | BODY MASS INDEX: 31.61 KG/M2 | WEIGHT: 201.4 LBS | HEIGHT: 67 IN | HEART RATE: 88 BPM | SYSTOLIC BLOOD PRESSURE: 124 MMHG | DIASTOLIC BLOOD PRESSURE: 68 MMHG

## 2020-07-28 DIAGNOSIS — D22.9 JUNCTIONAL NEVUS: ICD-10-CM

## 2020-07-28 DIAGNOSIS — L82.1 SEBORRHEIC KERATOSIS: Primary | ICD-10-CM

## 2020-07-28 PROCEDURE — 88305 TISSUE EXAM BY PATHOLOGIST: CPT | Performed by: PATHOLOGY

## 2020-07-28 PROCEDURE — 99214 OFFICE O/P EST MOD 30 MIN: CPT | Performed by: SURGERY

## 2020-07-28 PROCEDURE — 11440 EXC FACE-MM B9+MARG 0.5 CM/<: CPT | Performed by: SURGERY

## 2020-07-28 NOTE — PROGRESS NOTES
Patient presents today for elective removal of two nevi from his face  These have recently enlarged and intermittently gets inflamed but are benign in appearance  Skin excision  Date/Time: 7/28/2020 10:24 AM  Performed by: David Magana MD  Authorized by: David Magana MD     Procedure Details - Skin Excision:     Number of Lesions:  2  Lesion 1:     Body area:  Head/neck    Head/neck location:  Nose    Initial size (mm):  10       Malignancy: benign lesion      Repair type:  Linear closure    Closure complexity: simple       Likely junctional nevus excise from bridge of nose  Skin ellipse removed with lesion  Wound size a 1 cm long by 5 mm wide by 5 mm deep  Closed with 2, 5 0 nylon interrupted sutures  Lesion 2:     Body area:  [address]    Head/neck location:  R cheek        Initial size (mm):  15    Malignancy: benign lesion      Repair type:  Linear closure    Closure complexity: simple       Likely Seborrheic keratosis full-thickness skin biopsy  Wound size 18 mm long by 8 mm wide by 5 mm deep      Closed with 3, interrupted 5 O nylon sutures        Bacitracin in sterile dressing applied to each site    Follow-up 1 week for suture removal and Steri-Strip placement

## 2020-08-06 ENCOUNTER — TRANSCRIBE ORDERS (OUTPATIENT)
Dept: VASCULAR SURGERY | Facility: CLINIC | Age: 65
End: 2020-08-06

## 2020-08-06 DIAGNOSIS — I73.9 PERIPHERAL VASCULAR DISEASE, UNSPECIFIED (HCC): Primary | ICD-10-CM

## 2020-08-20 ENCOUNTER — TELEPHONE (OUTPATIENT)
Dept: ADMINISTRATIVE | Facility: HOSPITAL | Age: 65
End: 2020-08-20

## 2020-08-21 ENCOUNTER — OFFICE VISIT (OUTPATIENT)
Dept: VASCULAR SURGERY | Facility: CLINIC | Age: 65
End: 2020-08-21
Payer: COMMERCIAL

## 2020-08-21 VITALS
TEMPERATURE: 98.1 F | HEART RATE: 74 BPM | WEIGHT: 197 LBS | SYSTOLIC BLOOD PRESSURE: 114 MMHG | DIASTOLIC BLOOD PRESSURE: 70 MMHG | BODY MASS INDEX: 30.92 KG/M2 | HEIGHT: 67 IN

## 2020-08-21 DIAGNOSIS — K64.4 EXTERNAL HEMORRHOID: ICD-10-CM

## 2020-08-21 DIAGNOSIS — Z87.891 FORMER SMOKER: ICD-10-CM

## 2020-08-21 DIAGNOSIS — J43.2 CENTRILOBULAR EMPHYSEMA (HCC): ICD-10-CM

## 2020-08-21 DIAGNOSIS — Z13.6 ENCOUNTER FOR ABDOMINAL AORTIC ANEURYSM (AAA) SCREENING: Primary | ICD-10-CM

## 2020-08-21 DIAGNOSIS — K62.5 RECTAL BLEED: ICD-10-CM

## 2020-08-21 DIAGNOSIS — I73.9 PERIPHERAL VASCULAR DISEASE, UNSPECIFIED (HCC): ICD-10-CM

## 2020-08-21 DIAGNOSIS — G47.33 OBSTRUCTIVE SLEEP APNEA: ICD-10-CM

## 2020-08-21 PROCEDURE — 99244 OFF/OP CNSLTJ NEW/EST MOD 40: CPT | Performed by: SURGERY

## 2020-08-21 RX ORDER — SILDENAFIL 100 MG/1
TABLET, FILM COATED ORAL
COMMUNITY
Start: 2020-08-08

## 2020-08-21 RX ORDER — ROSUVASTATIN CALCIUM 20 MG/1
TABLET, COATED ORAL
COMMUNITY
Start: 2020-07-30

## 2020-08-21 NOTE — LETTER
August 21, 2020     Jose Maria Benz MD  [address] [address]    Patient: Shira Meek   YOB: 1955   Date of Visit: 8/21/2020       Dear Dr Matos Hiss:    Thank you for referring Christy Field to me for evaluation  Below are the relevant portions of my assessment and plan of care  New patient, referred by PCP for PAD  LEAD done 7/24/20  Patient reports RLE pain w/ walking x 100 yds  He also experiences pain w/ mowing the grass and using stairs  Resolves with a few minutes of rest   He describes the pain at the right calf and ankle as disabling  Denies tissue loss or rest pain  The lateral thigh pain is neurogenic in etiology and not related to his vascular occlusive disease  Patient is 72years old with a history of smoking therefore will require surveillance ultrasound to rule out abdominal aortic aneurysm  patient will continue on aspirin and statin  If intervention is performed he will require a 3 month course of clopidogrel  If you have questions, please do not hesitate to call me  I look forward to following Angie Pacheco along with you           Sincerely,        Portia Garcia MD        CC: No Recipients

## 2020-08-21 NOTE — PROGRESS NOTES
Vascular Center  08/21/20     Assessment/Plan:    Encounter for abdominal aortic aneurysm (AAA) screening  60-year-old gentleman with history of smoking  Will obtain ultrasound to rule out presence of AAA  Claudication in peripheral vascular disease ([facility] [address] )  Disabling claudication of right lower extremity without rest pain or tissue loss  Patient describes the right calf/ankle pain as disabling  Noninvasive vascular testing suggestive of right SFA occlusion  Will perform angiogram with intervention if appropriate  Discuss risks of procedure with patient and his accompanying significant other  They expressed understanding and wished me to proceed with angiogram/intervention  Diagnoses and all orders for this visit:    Encounter for abdominal aortic aneurysm (AAA) screening    Peripheral vascular disease, unspecified ([facility] [address] )  -     Ambulatory referral to Vascular Surgery    External hemorrhoid    Rectal bleed    [facility]    Obstructive sleep apnea    Former smoker    Other orders  -     rosuvastatin (CRESTOR) 20 MG tablet  -     sildenafil (VIAGRA) 100 mg tablet          Subjective:      Patient ID: Priscila Mock is a 59 y o  male  New patient, referred by PCP for PAD  LEAD done 7/24/20  Patient reports RLE pain w/ walking x 100 yds  He also experiences pain w/ mowing the grass and using stairs  Resolves with a few minutes of rest   He describes the pain at the right calf and ankle as disabling  Denies tissue loss or rest pain  The lateral thigh pain is neurogenic in etiology and not related to his vascular occlusive disease  Patient is 72years old with a history of smoking therefore will require surveillance ultrasound to rule out abdominal aortic aneurysm  Imaging:  LEAD performed on 07/24/2020 reveals an ANTONIO on the right of 0 57 and 0 96 on the left  Duplex portion of the study is suggestive of right SFA occlusion and severe stenosis on the left      Past Vascular Surgery: Guilherme Joseph  has a history of disabling claudication right lower extremity for approximately 6-8 months  No other vascular history  The following portions of the patient's history were reviewed and updated as appropriate: allergies, current medications, past family history, past medical history, past social history, past surgical history and problem list     Review of Systems   Constitutional: Negative  HENT: Negative  Eyes: Negative  Respiratory: Negative  Cardiovascular: Negative  Gastrointestinal: Negative  Endocrine: Negative  Genitourinary: Negative  Musculoskeletal:        Leg pain  Leg cramping with walking   Skin: Negative  Allergic/Immunologic: Negative  Neurological: Negative  Hematological: Negative  Psychiatric/Behavioral: Negative  Objective:    /70 (BP Location: Right arm, Patient Position: Sitting)   Pulse 74   Temp 98 1 °F (36 7 °C) (Tympanic)   Ht 5' 7" (1 702 m)   Wt 89 4 kg (197 lb)   BMI 30 85 kg/m²        Physical Exam  Vitals signs and nursing note reviewed  Constitutional:       Appearance: He is well-developed  HENT:      Head: Normocephalic and atraumatic  Eyes:      Conjunctiva/sclera: Conjunctivae normal       Pupils: Pupils are equal, round, and reactive to light  Neck:      Musculoskeletal: Normal range of motion and neck supple  Vascular: No JVD  Cardiovascular:      Rate and Rhythm: Normal rate and regular rhythm  Heart sounds: Normal heart sounds  Comments: No evidence of peripheral aneurysmal disease in the lower extremities  Pulmonary:      Effort: Pulmonary effort is normal  No respiratory distress  Breath sounds: Normal breath sounds  No stridor  No wheezing or rales  Chest:      Chest wall: No tenderness  Abdominal:      General: There is no distension  Palpations: Abdomen is soft  There is no mass  Tenderness: There is no abdominal tenderness   There is no guarding or rebound  Comments: No AAA appreciated on examination  Musculoskeletal: Normal range of motion  General: No tenderness or deformity  Skin:     General: Skin is warm and dry  Findings: No erythema or rash  Neurological:      Mental Status: He is alert and oriented to person, place, and time  Psychiatric:         Behavior: Behavior normal          Thought Content:  Thought content normal            Pulse Exam  L brachial + R brachial +   L radial + R radial +   L inguinal + R inguinal +   L popliteal D R popliteal D   L posterior tibial D R posterior tibial D   L peroneal D R peroneal D   L dorsalis pedis D R dorsalis pedis D   Operative Scheduling Information:    [facility]    Physician:  Me    Surgery: angio/interv left groin    Urgency:  Standard    Case Length:  Normal    Post-op Bed:  Outpatient    OR Table:  IR    Equipment Needs:  None    Medication Instructions:  Aspirin:   Continue (do not hold)    Hydration:  No

## 2020-08-21 NOTE — ASSESSMENT & PLAN NOTE
Disabling claudication of right lower extremity without rest pain or tissue loss  Patient describes the right calf/ankle pain as disabling  Noninvasive vascular testing suggestive of right SFA occlusion  Will perform angiogram with intervention if appropriate  Discuss risks of procedure with patient and his accompanying significant other  They expressed understanding and wished me to proceed with angiogram/intervention

## 2020-08-28 ENCOUNTER — TELEPHONE (OUTPATIENT)
Dept: ADMINISTRATIVE | Facility: HOSPITAL | Age: 65
End: 2020-08-28

## 2020-08-28 NOTE — TELEPHONE ENCOUNTER
Check out staff: Obdulio Ivy: Under Reason For Call, comments MUST be formatted as:   (Surgeon's Initials) / (Procedure)    PHYSICIAN / HOSPITAL: Jessica Romero (NPI: [id number]) Thomas Tata (Tax: [id number] / NPI: [id number])    PROCEDURE: angio/interv left groin    LEVEL: 2    REP: No    ASSISTANT SURGEON: No    ALLERGIES: Patient has no known allergies  INSTRUCTIONS GIVEN: AGRAM INSTRUCTIONS    BLOOD THINNERS / MED HOLD: Do not hold Aspirin (ASA)   HYDRATION REQUIRED: Patient does not require hydration  DIALYSIS: Patient is not on dialysis  *Please ROUTE this encounter to [facility] Surgery Coordinator   AND   Send COMPLETE CONSENT to Vascular Surgery Schedulers e-mail group*    I certify that patient has signed, printed, timed, and dated their surgery consent  I certify that BOTH sides of the completed surgery consent have been scanned into the patient's Epic chart by myself on 8/28/2020  *Please ROUTE this encounter to [facility] Clearance Pool   AND   Send CLEARANCE FORM(S) to Vascular Nursing e-mail group*    Patient does not require any pre operative clearance

## 2020-08-31 DIAGNOSIS — Z11.59 SCREENING FOR VIRAL DISEASE: Primary | ICD-10-CM

## 2020-08-31 DIAGNOSIS — Z13.6 SCREENING FOR AAA (AORTIC ABDOMINAL ANEURYSM): Primary | ICD-10-CM

## 2020-08-31 NOTE — TELEPHONE ENCOUNTER
Surgery Date: 9-17-20  Primary Surgeon: Sanaz Lopez (NPI: [id number])  Assisting Surgeon: Not Applicable (N/A)  Facility: [facility] (Tax: [id number] / NPI: [id number])  Inpatient / Outpatient: Outpatient  Level: 2    Clearance Received: No clearance ordered  Consent Received: Yes, scanned into Epic on 8-28-20  Medication Hold / Last Dose: No  VQI Spreadsheet: Not Applicable (N/A)  IR Notified: YES  Rep  Notified: Not Applicable (N/A)  Equipment Needs: Not Applicable (N/A)  Vas Lab Requested: Not Applicable (N/A)  Patient Contacted: 8-31-20    Diagnosis: Z13 6  Procedure/ CPT Code(s): 15761 [id number]    Is this an EVLT Case? No     Is patient requesting a call when authorization has been obtained? Patient did not request a call  Post Operative Date/ Time: To Be Determined (TBD)     *Please review with patient med hold, PATs, and check H&P *  PATIENT WAS MAILED SURGERY/SHOWERING/DISCHARGE/COVID INSTRUCTIONS AFTER REVIEWING WITH HER VIA PHONE CALL  Spoke to patients wife to schedule procedure Patient was told to have blood work and covid testing done before procedure   Patient was told nothing to eat or drink after midnight on 9-16-20  Patient confirmed and understood the instructions   [facility]

## 2020-09-01 ENCOUNTER — HOSPITAL ENCOUNTER (OUTPATIENT)
Dept: RADIOLOGY | Facility: HOSPITAL | Age: 65
Discharge: HOME | End: 2020-09-01
Attending: SURGERY
Payer: MEDICARE

## 2020-09-01 DIAGNOSIS — Z13.6 ENCOUNTER FOR ABDOMINAL AORTIC ANEURYSM (AAA) SCREENING: ICD-10-CM

## 2020-09-01 PROCEDURE — 93979 VASCULAR STUDY: CPT

## 2020-09-02 PROCEDURE — 93979 VASCULAR STUDY: CPT | Performed by: SURGERY

## 2020-09-10 NOTE — TELEPHONE ENCOUNTER
Per representative Scott Ferreira, at [facility] patient's plan terminated 09/01/20  As of today, patient has not attempted to reinstate his policy      (Call Ref #: [id number])

## 2020-09-11 ENCOUNTER — TELEPHONE (OUTPATIENT)
Dept: RADIOLOGY | Facility: HOSPITAL | Age: 65
End: 2020-09-11

## 2020-09-14 ENCOUNTER — TRANSCRIBE ORDERS (OUTPATIENT)
Dept: ADMINISTRATIVE | Facility: HOSPITAL | Age: 65
End: 2020-09-14

## 2020-09-14 ENCOUNTER — APPOINTMENT (OUTPATIENT)
Dept: LAB | Facility: HOSPITAL | Age: 65
End: 2020-09-14
Attending: SURGERY
Payer: MEDICARE

## 2020-09-14 DIAGNOSIS — Z13.6 SCREENING FOR AAA (AORTIC ABDOMINAL ANEURYSM): ICD-10-CM

## 2020-09-14 DIAGNOSIS — Z11.59 SCREENING FOR VIRAL DISEASE: ICD-10-CM

## 2020-09-14 LAB
ANION GAP SERPL CALCULATED.3IONS-SCNC: 11 MMOL/L (ref 4–13)
BUN SERPL-MCNC: 29 MG/DL (ref 5–25)
CALCIUM SERPL-MCNC: 9.3 MG/DL (ref 8.3–10.1)
CHLORIDE SERPL-SCNC: 103 MMOL/L (ref 100–108)
CO2 SERPL-SCNC: 24 MMOL/L (ref 21–32)
CREAT SERPL-MCNC: 1.34 MG/DL (ref 0.6–1.3)
ERYTHROCYTE [DISTWIDTH] IN BLOOD BY AUTOMATED COUNT: 13.2 % (ref 11.6–15.1)
GFR SERPL CREATININE-BSD FRML MDRD: 55 ML/MIN/1.73SQ M
GLUCOSE P FAST SERPL-MCNC: 107 MG/DL (ref 65–99)
HCT VFR BLD AUTO: 38.4 % (ref 36.5–49.3)
HGB BLD-MCNC: 12.7 G/DL (ref 12–17)
INR PPP: 0.95 (ref 0.84–1.19)
MCH RBC QN AUTO: 29.1 PG (ref 26.8–34.3)
MCHC RBC AUTO-ENTMCNC: 33.1 G/DL (ref 31.4–37.4)
MCV RBC AUTO: 88 FL (ref 82–98)
PLATELET # BLD AUTO: 299 THOUSANDS/UL (ref 149–390)
PMV BLD AUTO: 11 FL (ref 8.9–12.7)
POTASSIUM SERPL-SCNC: 4.2 MMOL/L (ref 3.5–5.3)
PROTHROMBIN TIME: 12.6 SECONDS (ref 11.6–14.5)
RBC # BLD AUTO: 4.37 MILLION/UL (ref 3.88–5.62)
SARS-COV-2 RNA RESP QL NAA+PROBE: NEGATIVE
SODIUM SERPL-SCNC: 138 MMOL/L (ref 136–145)
WBC # BLD AUTO: 8.27 THOUSAND/UL (ref 4.31–10.16)

## 2020-09-14 PROCEDURE — 87635 SARS-COV-2 COVID-19 AMP PRB: CPT

## 2020-09-14 PROCEDURE — 85610 PROTHROMBIN TIME: CPT

## 2020-09-14 PROCEDURE — 36415 COLL VENOUS BLD VENIPUNCTURE: CPT

## 2020-09-14 PROCEDURE — 80048 BASIC METABOLIC PNL TOTAL CA: CPT

## 2020-09-14 PROCEDURE — 85027 COMPLETE CBC AUTOMATED: CPT

## 2020-09-15 ENCOUNTER — TELEPHONE (OUTPATIENT)
Dept: SURGERY | Facility: HOSPITAL | Age: 65
End: 2020-09-15

## 2020-09-16 RX ORDER — SODIUM CHLORIDE 9 MG/ML
75 INJECTION, SOLUTION INTRAVENOUS CONTINUOUS
Status: CANCELLED | OUTPATIENT
Start: 2020-09-17

## 2020-09-17 ENCOUNTER — HOSPITAL ENCOUNTER (OUTPATIENT)
Dept: NON INVASIVE DIAGNOSTICS | Facility: HOSPITAL | Age: 65
Discharge: HOME | End: 2020-09-18
Attending: SURGERY | Admitting: SURGERY
Payer: MEDICARE

## 2020-09-17 DIAGNOSIS — I70.211 ATHEROSCLEROSIS OF NATIVE ARTERY OF RIGHT LOWER EXTREMITY WITH INTERMITTENT CLAUDICATION (HCC): ICD-10-CM

## 2020-09-17 DIAGNOSIS — Z13.6 SCREENING FOR AAA (AORTIC ABDOMINAL ANEURYSM): ICD-10-CM

## 2020-09-17 DIAGNOSIS — I73.9 CLAUDICATION IN PERIPHERAL VASCULAR DISEASE (HCC): Primary | ICD-10-CM

## 2020-09-17 LAB
KCT BLD-ACNC: 195 SEC (ref 89–137)
KCT BLD-ACNC: 219 SEC (ref 89–137)
SPECIMEN SOURCE: ABNORMAL
SPECIMEN SOURCE: ABNORMAL

## 2020-09-17 PROCEDURE — 76937 US GUIDE VASCULAR ACCESS: CPT | Performed by: SURGERY

## 2020-09-17 PROCEDURE — 37225 HB FEM/POPL REVAS W/ATHER: CPT

## 2020-09-17 PROCEDURE — C1887 CATHETER, GUIDING: HCPCS

## 2020-09-17 PROCEDURE — 75625 CONTRAST EXAM ABDOMINL AORTA: CPT

## 2020-09-17 PROCEDURE — C2623 CATH, TRANSLUMIN, DRUG-COAT: HCPCS

## 2020-09-17 PROCEDURE — 99153 MOD SED SAME PHYS/QHP EA: CPT

## 2020-09-17 PROCEDURE — C1769 GUIDE WIRE: HCPCS

## 2020-09-17 PROCEDURE — C1894 INTRO/SHEATH, NON-LASER: HCPCS

## 2020-09-17 PROCEDURE — 75710 ARTERY X-RAYS ARM/LEG: CPT | Performed by: SURGERY

## 2020-09-17 PROCEDURE — C1725 CATH, TRANSLUMIN NON-LASER: HCPCS

## 2020-09-17 PROCEDURE — C1884 EMBOLIZATION PROTECT SYST: HCPCS

## 2020-09-17 PROCEDURE — C1760 CLOSURE DEV, VASC: HCPCS

## 2020-09-17 PROCEDURE — G9198 NO ORDER FOR CEPH NO REASON: HCPCS | Performed by: SURGERY

## 2020-09-17 PROCEDURE — 99152 MOD SED SAME PHYS/QHP 5/>YRS: CPT | Performed by: SURGERY

## 2020-09-17 PROCEDURE — C1714 CATH, TRANS ATHERECTOMY, DIR: HCPCS

## 2020-09-17 PROCEDURE — 37225 PR REVSC OPN/PRQ FEM/POP W/ATHRC/ANGIOP SM VSL: CPT | Performed by: SURGERY

## 2020-09-17 PROCEDURE — 99152 MOD SED SAME PHYS/QHP 5/>YRS: CPT

## 2020-09-17 PROCEDURE — 75710 ARTERY X-RAYS ARM/LEG: CPT

## 2020-09-17 PROCEDURE — 85347 COAGULATION TIME ACTIVATED: CPT

## 2020-09-17 RX ORDER — OXYCODONE HYDROCHLORIDE 5 MG/1
5 TABLET ORAL EVERY 4 HOURS PRN
Status: DISCONTINUED | OUTPATIENT
Start: 2020-09-17 | End: 2020-09-18 | Stop reason: HOSPADM

## 2020-09-17 RX ORDER — MIDAZOLAM HYDROCHLORIDE 2 MG/2ML
INJECTION, SOLUTION INTRAMUSCULAR; INTRAVENOUS CODE/TRAUMA/SEDATION MEDICATION
Status: COMPLETED | OUTPATIENT
Start: 2020-09-17 | End: 2020-09-17

## 2020-09-17 RX ORDER — SODIUM CHLORIDE 9 MG/ML
75 INJECTION, SOLUTION INTRAVENOUS CONTINUOUS
Status: DISCONTINUED | OUTPATIENT
Start: 2020-09-17 | End: 2020-09-18 | Stop reason: HOSPADM

## 2020-09-17 RX ORDER — ACETAMINOPHEN 325 MG/1
650 TABLET ORAL EVERY 4 HOURS PRN
Status: DISCONTINUED | OUTPATIENT
Start: 2020-09-17 | End: 2020-09-18 | Stop reason: HOSPADM

## 2020-09-17 RX ORDER — CLOPIDOGREL BISULFATE 75 MG/1
75 TABLET ORAL DAILY
Qty: 90 TABLET | Refills: 0 | Status: SHIPPED | OUTPATIENT
Start: 2020-09-17

## 2020-09-17 RX ORDER — OXYCODONE HYDROCHLORIDE 10 MG/1
10 TABLET ORAL EVERY 4 HOURS PRN
Status: DISCONTINUED | OUTPATIENT
Start: 2020-09-17 | End: 2020-09-18 | Stop reason: HOSPADM

## 2020-09-17 RX ORDER — CLOPIDOGREL BISULFATE 75 MG/1
75 TABLET ORAL DAILY
Status: DISCONTINUED | OUTPATIENT
Start: 2020-09-18 | End: 2020-09-18 | Stop reason: HOSPADM

## 2020-09-17 RX ORDER — HEPARIN SODIUM 1000 [USP'U]/ML
INJECTION, SOLUTION INTRAVENOUS; SUBCUTANEOUS CODE/TRAUMA/SEDATION MEDICATION
Status: COMPLETED | OUTPATIENT
Start: 2020-09-17 | End: 2020-09-17

## 2020-09-17 RX ORDER — FENTANYL CITRATE 50 UG/ML
INJECTION, SOLUTION INTRAMUSCULAR; INTRAVENOUS CODE/TRAUMA/SEDATION MEDICATION
Status: COMPLETED | OUTPATIENT
Start: 2020-09-17 | End: 2020-09-17

## 2020-09-17 RX ORDER — CLOPIDOGREL BISULFATE 75 MG/1
300 TABLET ORAL ONCE
Status: COMPLETED | OUTPATIENT
Start: 2020-09-17 | End: 2020-09-17

## 2020-09-17 RX ORDER — LIDOCAINE WITH 8.4% SOD BICARB 0.9%(10ML)
SYRINGE (ML) INJECTION CODE/TRAUMA/SEDATION MEDICATION
Status: COMPLETED | OUTPATIENT
Start: 2020-09-17 | End: 2020-09-17

## 2020-09-17 RX ORDER — SODIUM CHLORIDE 9 MG/ML
75 INJECTION, SOLUTION INTRAVENOUS CONTINUOUS
Status: DISPENSED | OUTPATIENT
Start: 2020-09-17 | End: 2020-09-18

## 2020-09-17 RX ADMIN — MIDAZOLAM HYDROCHLORIDE 1 MG: 1 INJECTION, SOLUTION INTRAMUSCULAR; INTRAVENOUS at 14:15

## 2020-09-17 RX ADMIN — HEPARIN SODIUM 6000 UNITS: 1000 INJECTION INTRAVENOUS; SUBCUTANEOUS at 14:07

## 2020-09-17 RX ADMIN — MIDAZOLAM HYDROCHLORIDE 1 MG: 1 INJECTION, SOLUTION INTRAMUSCULAR; INTRAVENOUS at 13:42

## 2020-09-17 RX ADMIN — HEPARIN SODIUM 4000 UNITS: 1000 INJECTION INTRAVENOUS; SUBCUTANEOUS at 14:18

## 2020-09-17 RX ADMIN — CLOPIDOGREL BISULFATE 300 MG: 75 TABLET ORAL at 18:13

## 2020-09-17 RX ADMIN — SODIUM CHLORIDE 75 ML/HR: 0.9 INJECTION, SOLUTION INTRAVENOUS at 18:14

## 2020-09-17 RX ADMIN — Medication 10 ML: at 13:43

## 2020-09-17 RX ADMIN — SODIUM CHLORIDE 250 ML: 0.9 INJECTION, SOLUTION INTRAVENOUS at 13:43

## 2020-09-17 RX ADMIN — FENTANYL CITRATE 50 MCG: 50 INJECTION, SOLUTION INTRAMUSCULAR; INTRAVENOUS at 13:42

## 2020-09-17 RX ADMIN — IODIXANOL 55 ML: 320 INJECTION, SOLUTION INTRAVASCULAR at 16:45

## 2020-09-17 RX ADMIN — SODIUM CHLORIDE 75 ML/HR: 0.9 INJECTION, SOLUTION INTRAVENOUS at 12:12

## 2020-09-17 NOTE — PROGRESS NOTES
Patient denies pain and SOB  Left groin dressing clean, dry, and intact  Explained to patient that he is on complete bed rest until 9:00 pm tonight and to keep extremity straight Patient verbalized understanding  HOB flat  Distal pulses palpable  No bleeding or bruising to left groin

## 2020-09-17 NOTE — PROGRESS NOTES
Left groin dressing clean, dry, and intact  No bleeding or bruising noted  Patient denies pain and SOB  Patient instructed to remain on bedrest until 9:00 pm on 9-17-20  Patient verbalized understanding  Wife at bedside

## 2020-09-17 NOTE — PLAN OF CARE
Problem: PAIN - ADULT  Goal: Verbalizes/displays adequate comfort level or baseline comfort level  Description: Interventions:  - Encourage patient to monitor pain and request assistance  - Assess pain using appropriate pain scale  - Administer analgesics based on type and severity of pain and evaluate response  - Implement non-pharmacological measures as appropriate and evaluate response  - Consider cultural and social influences on pain and pain management  - Notify physician/advanced practitioner if interventions unsuccessful or patient reports new pain  Outcome: Progressing     Problem: INFECTION - ADULT  Goal: Absence or prevention of progression during hospitalization  Description: INTERVENTIONS:  - Assess and monitor for signs and symptoms of infection  - Monitor lab/diagnostic results  - Monitor all insertion sites  - Monitor endotracheal if appropriate and nasal secretions for changes in amount and color  - Administer medications as ordered  - Instruct and encourage patient and family to use good hand hygiene technique    Outcome: Progressing     Problem: SAFETY ADULT  Goal: Patient will remain free of falls  Description: INTERVENTIONS:  - Assess patient frequently for physical needs  -  Identify cognitive and physical deficits and behaviors that affect risk of falls    -  [place] fall precautions as indicated by assessment   - Educate patient/family on patient safety including physical limitations  - Instruct patient to call for assistance with activity based on assessment  - Modify environment to reduce risk of injury  - Consider OT/PT consult to assist with strengthening/mobility  Outcome: Progressing  Goal: Maintain or return to baseline ADL function  Description: INTERVENTIONS:  -  Assess patient's ability to carry out ADLs; assess patient's baseline for ADL function and identify physical deficits which impact ability to perform ADLs (bathing, care of mouth/teeth, toileting, grooming, dressing, etc )  - Assess/evaluate cause of self-care deficits   - Assess range of motion  - Assess patient's mobility; develop plan if impaired  - Assess patient's need for assistive devices and provide as appropriate  - Encourage maximum independence but intervene and supervise when necessary  - Involve family in performance of ADLs  - Assess for home care needs following discharge   - Consider OT consult to assist with ADL evaluation and planning for discharge  - Provide patient education as appropriate  Outcome: Progressing     Problem: DISCHARGE PLANNING  Goal: Discharge to home or other facility with appropriate resources  Description: INTERVENTIONS:  - Identify barriers to discharge w/patient and caregiver  - Arrange for needed discharge resources and transportation as appropriate  - Identify discharge learning needs (meds, wound care, etc )  - Arrange for interpretive services to assist at discharge as needed  - Refer to Case Management Department for coordinating discharge planning if the patient needs post-hospital services based on physician/advanced practitioner order or complex needs related to functional status, cognitive ability, or social support system  Outcome: Progressing     Problem: Knowledge Deficit  Goal: Patient/family/caregiver demonstrates understanding of disease process, treatment plan, medications, and discharge instructions  Description: Complete learning assessment and assess knowledge base  Interventions:  - Provide teaching at level of understanding  - Provide teaching via preferred learning methods  Outcome: Progressing     Problem: Knowledge Deficit  Goal: Patient/family/caregiver demonstrates understanding of disease process, treatment plan, medications, and discharge instructions  Description: Complete learning assessment and assess knowledge base    Interventions:  - Provide teaching at level of understanding  - Provide teaching via preferred learning methods  Outcome: Progressing

## 2020-09-17 NOTE — SEDATION DOCUMENTATION
Procedure ended  RLE angiogram with intervention completed without incident  Left femoral artery sheath removed, Mynx closure device deployed  Manual pressure held to site  Dressing applied to groin site  No bleeding or hematoma noted  Patient to remain flat 6 hours post procedure  Patient tolerated well and transferred to inpatient room in stable condition

## 2020-09-17 NOTE — DISCHARGE INSTRUCTIONS
ARTERIOGRAM    WHAT YOU SHOULD KNOW:   An angiogram is a procedure to look at arteries in your body  Arteries are the blood vessels that carry blood from your heart to your body  AFTER YOU LEAVE:     Self-care:   · Limit activity: Rest for the remainder of the day of your procedure  Have some one with you until the next morning  Keep your arm or leg straight as much as possible  Rest as much as possible, sitting lying or reclining  Walk only to go to the bathroom, to bed or to eat  If the angiogram catheter was put in your leg, use the stairs as little as possible  No driving  · Keep your wound clean and dry  You may shower 24 hours after your procedure  The bandage you have on should fall off in 2-3 days  If there is any drainage from the puncture site, you should put on a clean bandage  · Watch for bleeding and bruising: It is normal to have a bruise and soreness where the angiogram catheter went in  · Diet:   · You may resume your regular diet, Sips of flat soda will help with mild nausea  · Drink more liquids than usual for the next 24 hours      · IMMEDIATELY Contact Interventional Radiology at [phone] Abhijit PATIENTS: Contact Interventional Radiology at [phone]) Clif Baltazar PATIENTS: Contact Interventional Radiology at [phone]) if any of the following occur:  · If your bruise gets larger or if you notice any active bleeding  APPLY DIRECT PRESSURE TO THE BLEEDING SITE  · If you notice increased swelling or have increased pain at the puncture site   · If you have any numbness or pain in the extremity of the puncture site   · If that extremity seems cold or pale      · You have fever greater than 101  · Persistent nausea or vomitting    Follow up with your primary healthcare provider  as directed: Write down your questions so you remember to ask them during your visits          Procedural Sedation   WHAT YOU NEED TO KNOW:   Procedural sedation is medicine used during procedures to help you feel relaxed and calm  You will remember little to none of the procedure  After sedation you may feel tired, weak, or unsteady on your feet  You may also have trouble concentrating or short-term memory loss  These symptoms should go away in 24 hours or less  DISCHARGE INSTRUCTIONS:     Call 911 or have someone else call for any of the following:   · You have sudden trouble breathing      · You cannot be woken  Contact Interventional Radiology at [phone]   Abhijit PATIENTS: Contact Interventional Radiology at [phone]) Favian Navneetbarbara PATIENTS: Contact Interventional Radiology at [phone]) if any of the following occur:     · You have a severe headache or dizziness      · Your heart is beating faster than usual     · You have a fever or chills      · Your skin is itchy, swollen, or you have a rash      · You have nausea or are vomiting for more than 8 hours after the procedure       · You have questions or concerns about your condition or care  Self-care:   · Have someone stay with you for 24 hours  This person can drive you to errands and help you do things around the house  This person can also watch for problems       · Rest and do quiet activities for 24 hours  Do not exercise, ride a bike, or play sports  Stand up slowly to prevent dizziness and falls  Take short walks around the house with another person  Slowly return to your usual activities the next day       · Do not drive or use dangerous machines or tools for 24 hours  You may injure yourself or others  Examples include a lawnmower, saw, or drill  Do not return to work for 24 hours if you use dangerous machines or tools for work       · Do not make important decisions for 24 hours  For example, do not sign important papers or invest money       · Drink liquids as directed  Liquids help flush the sedation medicine out of your body   Ask how much liquid to drink each day and which liquids are best for you       · Eat small, frequent meals to prevent nausea and vomiting  Start with clear liquids such as juice or broth  If you do not vomit after clear liquids, you can eat your usual foods       · Do not drink alcohol or take medicines that make you drowsy  This includes medicines that help you sleep and anxiety medicines  Ask your healthcare provider if it is safe for you to take pain medicine  Follow up with your healthcare provider as directed: Write down your questions so you remember to ask them during your visits

## 2020-09-17 NOTE — OP NOTE
INDICATIONS:  Disabling claudication right lower extremity    PROCEDURE PERFORMED:  1  Ultrasound-guided access of the left common femoral artery  2  Diagnostic aortogram with bilateral pelvic runoff  3  right lower extremity angiogram  4  Cannulation of popliteal artery  5  Pre dilatation of superficial femoral artery occlusion using a 3 mm x 6 cm length balloon angioplasty catheter followed by 4 mm x 6 cm length balloon angioplasty catheter  6  Pantheris SV atherectomy of superficial femoral artery occlusion  7  Balloon angioplasty using 6 mm x 6 cm length bard DCB  8  Placement of 6 mm spider embolic protection device in below-knee popliteal artery  9  Mynx closure left common femoral artery  10  Limited angiogram left iliofemoral system    SURGEON:  Keven Jones MD    ANESTHESIA:  Local with sedation (MAC) under my supervision    OPERATIVE PROCEDURE:  After patient identification and informed consent, the patient was taken to the procedure room in the interventional radiology suite and placed in a supine position  Adequate sedation was administered via IV route  The patients bilateral groins were cleaned and draped in sterile surgical fashion using Chlorhexidine  1% local Lidocaine was injected into the skin and subcutaneous tissue overlying the left femoral pulsation  Under ultrasound guidance a micro access needle was used to access the  left common femoral artery  There was diffuse calcific plaque in the posterior aspect of the common femoral artery, however it was widely patent  After obtaining pulsatile flow, a micro guidewire was inserted through the needle  The needle was removed and a 4 Western Paige micro access sheath was inserted over the guidewire using Seldinger technique  A micro sheath angiogram was then performed to ensure we were in the proper portion of the femoral artery: this was ensured   The micro wire was removed and a Rai wire was advanced under fluoroscopic guidance through the micro sheath and parked in the proximal abdominal aorta under fluoroscopic guidance  The micro sheath was removed and a 5 Guyanese sheath was inserted again using Seldinger technique over the wire  An Omni Flush catheter was next advanced over the guidewire and fluoroscopic guidance was used to park the catheter in the proximal abdominal aorta  Angiogram of the abdominal aorta with pelvic runoff was performed  Using a Glidewire and Omni flush catheter we went up and over and selected the right external iliac artery  right lower extremity runoff was performed  A 6 Guyanese 45 cm destination sheath was then passed over a stiff Glidewire  The occluded segment of superficial femoral artery was then crossed with the stiff straight Glidewire with a 120 cm length 4 Guyanese tapered glide catheter  Angiogram was performed to ensure we were in the true lumen, this was ensured  The occluded segment was pre-dilated 1st with a 3 mm x 6 cm length balloon angioplasty catheter followed by a 4 mm x 6 cm length balloon angioplasty catheter  A 6 mm spider embolic protection device was then passed through the glide catheter into the below-knee popliteal artery, good apposition was noted  The Pantheris SR device was then passed over the 014 wire  Multiple passes were taken  Great care was taken to ensure cutting did not extend into or beyond the media  Completion angiogram showed recanalization of the occluded segment  There was significant irregularity of the treated segment  Therefore, a 6 mm x 6 cm length Bard DCB balloon angioplasty catheter was placed and insufflated to full effacement  The balloon was left in place for 2 minutes  Completion angiogram showed an excellent result  Small segment of dissection was noted at the proximal portion of the treated segment however, this was not flow limiting  Completion angiogram of the tibial vessels showed no evidence of embolization    The 6 Guyanese 45 cm destination sheath was then removed over the stiff wire  A short 6 Somali sheath was placed  The left groin was then re-prepped  A Mynx device was used to seal the left common femoral artery and hemostasis was ensured  RRADIOGRAPHIC SUPERVISION AND INTERPRETATION OF TEST:    1  Abdominal aortogram findings -   Abdominal aorta - patent    2  Pelvic runoff findings -   Left common iliac artery-patent  Left internal iliac artery-patent  Left external iliac artery-patent    Right common iliac artery-significant plaque irregularity noted however no more than 20% stenosis  Right internal iliac artery-patent  Right external iliac artery-patent    2  Left lower extremity angiogram findings-    common femoral artery- patent  Profunda femorals artery- patent  Superficial femoral artery-only the proximal portion visualized noted to be patent    3  Right lower extremity angiogram findings-    common femoral artery- patent  Profunda femorals artery- patent  Superficial femoral artery-proximal portion patent  A 4-6 cm segment of occlusion was noted at the mid SFA  There was immediate reconstitution  Large collaterals were noted  Popliteal artery-patent  Anterior tibial artery-patent, extended to the dorsalis pedis  Tibioperoneal trunk-patent  Posterior tibial artery-patent proximally our somewhat atretic distally  Ultimately formed posterior tibial into the foot however the arch was not well visualized  Peroneal artery-patent  Plantar arch-not well visualized  Contrast Type/Amount -  59 CC VISIPAQUE 320    Fluoro Time (Mandatory) -   MIN    254mGy    Devices - MYNX     SEDATION TIME: 80 MIN       PLAN:    Will initiate Plavix  Patient will be observed overnight

## 2020-09-17 NOTE — PROGRESS NOTES
Patient admitted to floor  Patient denies pain and SOB  Left groin dressing clean, dry, and intact  Explained to patient that he is on complete bed rest until 9:00 pm tonight and to keep extremity straight Patient verbalized understanding  HOB flat  Distal pulses palpable  No bleeding or bruising to left groin  Patient denies pain and SOB

## 2020-09-17 NOTE — PROGRESS NOTES
Patient denies pain and SOB  Left groin dressing clean, dry, and intact  HOB at 30 degrees  Distal pulses palpable  No bleeding or bruising to left groin

## 2020-09-18 VITALS
WEIGHT: 200.62 LBS | BODY MASS INDEX: 31.49 KG/M2 | HEIGHT: 67 IN | OXYGEN SATURATION: 97 % | TEMPERATURE: 98.4 F | HEART RATE: 73 BPM | DIASTOLIC BLOOD PRESSURE: 62 MMHG | RESPIRATION RATE: 16 BRPM | SYSTOLIC BLOOD PRESSURE: 114 MMHG

## 2020-09-18 PROBLEM — I70.219 ATHEROSCLER NATIVE ARTERIES THE EXTREMITIES W/INTERMIT CLAUDICATION (HCC): Status: ACTIVE | Noted: 2020-08-21

## 2020-09-18 PROCEDURE — NC001 PR NO CHARGE: Performed by: NURSE PRACTITIONER

## 2020-09-18 PROCEDURE — 94664 DEMO&/EVAL PT USE INHALER: CPT

## 2020-09-18 PROCEDURE — 99213 OFFICE O/P EST LOW 20 MIN: CPT | Performed by: NURSE PRACTITIONER

## 2020-09-18 RX ORDER — CLOPIDOGREL BISULFATE 75 MG/1
75 TABLET ORAL DAILY
Qty: 60 TABLET | Refills: 0 | Status: SHIPPED | OUTPATIENT
Start: 2020-09-19

## 2020-09-18 RX ADMIN — CLOPIDOGREL BISULFATE 75 MG: 75 TABLET ORAL at 09:02

## 2020-09-18 NOTE — ASSESSMENT & PLAN NOTE
72year old male former smoker with HTN, COPD, PAD w/ R SFA occlusion with lifestyle limiting claudication now s/p R SFA recanalization and DCB angioplasty  He was observed overnight     Left groin access with steri strips  No bleeding  No hematoma  Plan  -Started on loading dose of Plavix   Plavix 75mg daily   -Continue ASA  -Continue Crestor   -Counseled on vaping cessation  -Discussed with Dr Maricarmen Mitchell   -Outpatient follow up with Dr Raymundo Greenwood in 2 weeks scheduled 10/6  -D/c home

## 2020-09-18 NOTE — RESPIRATORY THERAPY NOTE
copd education completed at bedside prior to discharge for s&s of exacerbation, zone tool, booklet given, better breathers support group, nebulizer treatment, mde and proper inhalation of mdi, rescue vs maintenance inhalers and pulmonary function testing

## 2020-09-18 NOTE — ASSESSMENT & PLAN NOTE
72year old male former smoker with HTN, COPD, PAD w/ R SFA occlusion with lifestyle limiting claudication now s/p R SFA recanalization and DCB angioplasty  He was observed overnight     Left groin access with steri strips  No bleeding  No hematoma  Plan  -Started on loading dose of Plavix   Plavix 75mg daily   -Counseled on vaping cessation  -Outpatient follow up with Dr Camelia Johnson in 2 weeks   -D/c home

## 2020-09-18 NOTE — DISCHARGE SUMMARY
Discharge Summary   Conway Siemens 72 y o  male MRN: [id number]  Unit/Bed#: 4 [place] 559-09 Encounter: [id number]    Admission Date: 9/17/2020     Discharge Date:09/18/20    Attending:Gregory Gwenetta Leventhal, MD     Admitting Diagnosis: PAD w/ lifestyle limiting right calf claudication     Principle/ Secondary Diagnosis:  Past Medical History:   Diagnosis Date    Arthritis     hands    Centrilobular emph[facility]sema ([facility] )     COPD (chronic obstructive pulmonary disease) ([facility] )     BOB (dyspnea on exertion)     Hypertension     URI (upper respiratory infection)      Past Surgical History:   Procedure Laterality Date    COLONOSCOPY N/A 1/19/2017    Procedure: COLONOSCOPY;  Surgeon: Lashay Valerio MD;  [facility] GI LAB; Service:     HI EXCISION MULTIPLE EXTERNAL PAPILLAE/TAGS ANUS N/A 7/8/2020    Procedure: EXCISION  BIOPSY LESION/ MASS  ANAL/RECTAL;  Surgeon: Sagrario Camacho MD;  Location: [address] [place] [address];  Service: General    VASECTOMY         Procedures Performed: No orders of the defined types were placed in this encounter  IR ABDOMINAL AORTAGRAM [AZQ819]  IR ABDOMINAL AORTAGRAM  * No procedures listed *    Laboratory data at discharge:   Results from last 7 days   Lab Units 09/14/20  1108   WBC Thousand/uL 8 27   HEMOGLOBIN g/dL 12 7   HEMATOCRIT % 38 4   PLATELETS Thousands/uL 299     Results from last 7 days   Lab Units 09/14/20  1108   POTASSIUM mmol/L 4 2   CHLORIDE mmol/L 103   CO2 mmol/L 24   BUN mg/dL 29*   CREATININE mg/dL 1 34*   CALCIUM mg/dL 9 3     Results from last 7 days   Lab Units 09/14/20  1108   INR  0 95           Discharge instructions/Information to patient and family:   See after visit summary for information provided to patient and family  Discharge Medications:  See after visit summary for reconciled discharge medications provided to patient and family        Hospital Course:   72year old male former smoker with HTN, HLD, COPD, PAD w/ R SFA occlusion with lifestyle limiting claudication who was worked up as an outpatient and planned for angiogram  He underwent angiogram yesterday with Dr Maurisio Hines and had successful recanalization of occluded SFA and DCB angioplasty  He was started on loading dose of Plavix and then initiated on daily dosing  He was observed overnight  He did well overnight  In the a m  he was ambulating without issue  Left groin access site was intact without any drainage or bleeding  Steri strips intact  He had DP and PT signal by doppler in the right foot  He was stable for discharge home  He will continue ASA, crestor and was counseled on cessation of vaping  Rx Plavix 75mg x60 days was sent electronically to his pharmacy  He will follow up with Dr Maurisio Hines 10/6 at 9:15 at [facility] course was complicated by the following: none       Prior to discharge, the patient was given instructions for outpatient care and follow-up  The patient has been instructed to call w/ any questions, changes, or concerns for the medical condition  For further details of the hospitalization, please refer to the medical record  Condition at Discharge: stable     Provisions for Follow-Up Care:  See after visit summary for information related to follow-up care and any pertinent home health orders  Disposition: Home    Planned Readmission: No    Discharge Statement   I spent 20 minutes discharging the patient  This time was spent on the day of discharge  I had direct contact with the patient on the day of discharge  Additional documentation is required if more than 30 minutes were spent on discharge  CHRIS Camp  9/18/2020      This text is generated with voice recognition software  There may be translation, syntax,  or grammatical errors  If you have any questions, please contact the dictating provider

## 2020-09-18 NOTE — PROGRESS NOTES
Progress Note - Celestine Guillen 1955, 72 y o  male MRN: [id number]    Unit/Bed#: [address] [place] [address] Encounter: [id number]    Primary Care Provider: Ashia Issa MD   Date and time admitted to hospital: 9/17/2020  3:44 PM        Atheroscler native arteries the extremities w/intermit claudication [facility])  Assessment & Plan  72year old male former smoker with HTN, COPD, PAD w/ R SFA occlusion with lifestyle limiting claudication now s/p R SFA recanalization and DCB angioplasty  He was observed overnight     Left groin access with steri strips  No bleeding  No hematoma  Plan  -Started on loading dose of Plavix  Plavix 75mg daily   -Continue ASA  -Continue Crestor   -Counseled on vaping cessation  -Discussed with Dr Alexys Moon   -Outpatient follow up with Dr Inocente Yates in 2 weeks scheduled 10/6  -D/c home           Subjective:  S/p RLE angiogram with intervention  Denies any pain to left groin access site  Doplerable signal right foot  Patient eager for d/c home     Vitals:  /62   Pulse 73   Temp 98 4 °F (36 9 °C)   Resp 16   Ht 5' 7" (1 702 m)   Wt 91 kg (200 lb 9 9 oz)   SpO2 97%   BMI 31 42 kg/m²     I/Os:  I/O last 3 completed shifts: In: 690 [P O :680; I V :10]  Out: -   No intake/output data recorded      Lab Results and Cultures:   Lab Results   Component Value Date    WBC 8 27 09/14/2020    HGB 12 7 09/14/2020    HCT 38 4 09/14/2020    MCV 88 09/14/2020     09/14/2020     Lab Results   Component Value Date    CALCIUM 9 3 09/14/2020    K 4 2 09/14/2020    CO2 24 09/14/2020     09/14/2020    BUN 29 (H) 09/14/2020    CREATININE 1 34 (H) 09/14/2020     Lab Results   Component Value Date    INR 0 95 09/14/2020    PROTIME 12 6 09/14/2020        Blood Culture: No results found for: BLOODCX,   Urinalysis: No results found for: Hector Climes, SPECGRAV, PHUR, LEUKOCYTESUR, NITRITE, PROTEINUA, GLUCOSEU, KETONESU, BILIRUBINUR, BLOODU,   Urine Culture: No results found for: Ferol Persons, Wound Culure: No results found for: WOUNDCULT    Medications:  Current Facility-Administered Medications   Medication Dose Route Frequency    acetaminophen (TYLENOL) tablet 650 mg  650 mg Oral Q4H PRN    clopidogrel (PLAVIX) tablet 75 mg  75 mg Oral Daily    oxyCODONE (ROXICODONE) immediate release tablet 10 mg  10 mg Oral Q4H PRN    oxyCODONE (ROXICODONE) IR tablet 5 mg  5 mg Oral Q4H PRN    sodium chloride 0 9 % infusion  75 mL/hr Intravenous Continuous       Imaging:  Angiogram images reviewed     Physical Exam:    General appearance: alert and oriented, in no acute distress  Skin: Skin color, texture, turgor normal  No rashes or lesions  Neurologic: Grossly normal  Head: Normocephalic, without obvious abnormality, atraumatic  Eyes: conjunctivae/corneas clear  PERRL, EOM's intact  Fundi benign    Throat: lips, mucosa, and tongue normal; teeth and gums normal  Neck: no adenopathy, no carotid bruit, no JVD and supple, symmetrical, trachea midline  Lungs: clear to auscultation bilaterally  Chest wall: no tenderness  Heart: regular rate and rhythm, S1, S2 normal, no murmur, click, rub or gallop  Abdomen: soft, non-tender; bowel sounds normal; no masses,  no organomegaly  Extremities: extremities normal, warm and well-perfused; no cyanosis, clubbing, or edema left groin intact with steri strips no hematoma or PSA      Pulse exam:  Radial: Right: 2+ Left[de-identified] 2+  Femoral: Right: 2+ Left: 2+  Popliteal: Right: non-palpable Left: non-palpable  DP: Right: doppler signal   PT: Right: doppler signal       CHRIS Good  9/18/2020  [facility]  [phone]

## 2020-10-06 ENCOUNTER — OFFICE VISIT (OUTPATIENT)
Dept: VASCULAR SURGERY | Facility: CLINIC | Age: 65
End: 2020-10-06
Payer: MEDICARE

## 2020-10-06 VITALS
WEIGHT: 196 LBS | RESPIRATION RATE: 18 BRPM | BODY MASS INDEX: 30.76 KG/M2 | TEMPERATURE: 98.6 F | HEIGHT: 67 IN | HEART RATE: 80 BPM | SYSTOLIC BLOOD PRESSURE: 116 MMHG | DIASTOLIC BLOOD PRESSURE: 64 MMHG

## 2020-10-06 DIAGNOSIS — R09.89 BRUIT: ICD-10-CM

## 2020-10-06 DIAGNOSIS — I70.211 ATHEROSCLEROSIS OF NATIVE ARTERY OF RIGHT LOWER EXTREMITY WITH INTERMITTENT CLAUDICATION (HCC): ICD-10-CM

## 2020-10-06 DIAGNOSIS — I73.9 PERIPHERAL VASCULAR DISEASE, UNSPECIFIED (HCC): Primary | ICD-10-CM

## 2020-10-06 DIAGNOSIS — I70.211 ATHEROSCLEROSIS OF NATIVE ARTERY OF RIGHT LOWER EXTREMITY WITH INTERMITTENT CLAUDICATION (HCC): Primary | ICD-10-CM

## 2020-10-06 DIAGNOSIS — Z13.6 ENCOUNTER FOR ABDOMINAL AORTIC ANEURYSM (AAA) SCREENING: ICD-10-CM

## 2020-10-06 PROCEDURE — 99213 OFFICE O/P EST LOW 20 MIN: CPT | Performed by: SURGERY

## 2020-11-17 ENCOUNTER — HOSPITAL ENCOUNTER (OUTPATIENT)
Dept: RADIOLOGY | Facility: HOSPITAL | Age: 65
Discharge: HOME | End: 2020-11-17
Attending: SURGERY
Payer: MEDICARE

## 2020-11-17 DIAGNOSIS — I73.9 PERIPHERAL VASCULAR DISEASE, UNSPECIFIED (HCC): ICD-10-CM

## 2020-11-17 DIAGNOSIS — R09.89 BRUIT: ICD-10-CM

## 2020-11-17 PROCEDURE — 93880 EXTRACRANIAL BILAT STUDY: CPT | Performed by: SURGERY

## 2020-11-17 PROCEDURE — 93880 EXTRACRANIAL BILAT STUDY: CPT

## 2020-11-17 PROCEDURE — 93922 UPR/L XTREMITY ART 2 LEVELS: CPT | Performed by: SURGERY

## 2020-11-17 PROCEDURE — 93926 LOWER EXTREMITY STUDY: CPT

## 2020-11-17 PROCEDURE — 93926 LOWER EXTREMITY STUDY: CPT | Performed by: SURGERY

## 2020-11-19 ENCOUNTER — TELEPHONE (OUTPATIENT)
Dept: ADMINISTRATIVE | Facility: HOSPITAL | Age: 65
End: 2020-11-19

## 2020-11-20 ENCOUNTER — OFFICE VISIT (OUTPATIENT)
Dept: VASCULAR SURGERY | Facility: CLINIC | Age: 65
End: 2020-11-20
Payer: MEDICARE

## 2020-11-20 VITALS
DIASTOLIC BLOOD PRESSURE: 88 MMHG | SYSTOLIC BLOOD PRESSURE: 130 MMHG | TEMPERATURE: 97.5 F | HEART RATE: 74 BPM | BODY MASS INDEX: 31.08 KG/M2 | WEIGHT: 198 LBS | HEIGHT: 67 IN | RESPIRATION RATE: 18 BRPM

## 2020-11-20 DIAGNOSIS — I70.211 ATHEROSCLEROSIS OF NATIVE ARTERY OF RIGHT LOWER EXTREMITY WITH INTERMITTENT CLAUDICATION (HCC): ICD-10-CM

## 2020-11-20 DIAGNOSIS — I73.9 PERIPHERAL VASCULAR DISEASE, UNSPECIFIED (HCC): Primary | ICD-10-CM

## 2020-11-20 PROCEDURE — 99213 OFFICE O/P EST LOW 20 MIN: CPT | Performed by: SURGERY

## 2020-11-25 ENCOUNTER — TELEPHONE (OUTPATIENT)
Dept: VASCULAR SURGERY | Facility: CLINIC | Age: 65
End: 2020-11-25

## 2021-02-09 ENCOUNTER — OFFICE VISIT (OUTPATIENT)
Dept: PULMONOLOGY | Facility: MEDICAL CENTER | Age: 66
End: 2021-02-09
Payer: MEDICARE

## 2021-02-09 VITALS
DIASTOLIC BLOOD PRESSURE: 62 MMHG | HEART RATE: 67 BPM | HEIGHT: 67 IN | OXYGEN SATURATION: 97 % | BODY MASS INDEX: 31.39 KG/M2 | RESPIRATION RATE: 12 BRPM | WEIGHT: 200 LBS | SYSTOLIC BLOOD PRESSURE: 128 MMHG | TEMPERATURE: 97 F

## 2021-02-09 DIAGNOSIS — J43.2 CENTRILOBULAR EMPHYSEMA (HCC): Primary | ICD-10-CM

## 2021-02-09 DIAGNOSIS — I73.9 PERIPHERAL ARTERIAL DISEASE (HCC): ICD-10-CM

## 2021-02-09 DIAGNOSIS — G47.33 OBSTRUCTIVE SLEEP APNEA: ICD-10-CM

## 2021-02-09 PROCEDURE — 99214 OFFICE O/P EST MOD 30 MIN: CPT | Performed by: INTERNAL MEDICINE

## 2021-02-09 RX ORDER — ALBUTEROL SULFATE 2.5 MG/3ML
2.5 SOLUTION RESPIRATORY (INHALATION) EVERY 4 HOURS PRN
Qty: 180 VIAL | Refills: 6 | Status: SHIPPED | OUTPATIENT
Start: 2021-02-09

## 2021-02-09 NOTE — ASSESSMENT & PLAN NOTE
On September 17, 2020 Angela West did have angioplasty procedure right superficial femoral artery occlusion and also had arthrectomy done  This was done by vascular surgeon Dr Lenore Matamoros  He was having some clotted occasion pains in his right leg with exertional activity but since procedure  No longer is getting pain in his right calf with activity       he is on Plavix 75 mg daily and aspirin 81 mg daily

## 2021-02-09 NOTE — ASSESSMENT & PLAN NOTE
He did have diagnostic sleep study done June 2019 which showed mild BEVERLY with overall AHI of 5 5 and this increased to 14 9 during REM sleep  He did not want pursue CPAP therapy  Not have any excessive daytime somnolence  He is trying to lose weight

## 2021-02-09 NOTE — ASSESSMENT & PLAN NOTE
Moderate COPD with FEV1 of 1 96 L or 62% of predicted is stable    He is doing well on regimen of Anoro 1 puff daily and ProAir 2 puffs as needed     Angela Brett  Did get 1st dose of  COVID vaccination at [place] [address] and is scheduled for 2nd dose of Reji Rosa on February 24th

## 2021-02-09 NOTE — PROGRESS NOTES
Assessment/Plan        Problem List Items Addressed This Visit        Respiratory    Centrilobular emphysema ([facility] ) - Primary      Moderate COPD with FEV1 of 1 96 L or 62% of predicted is stable  He is doing well on regimen of Anoro 1 puff daily and ProAir 2 puffs as needed     Kim Mariscal  Did get 1st dose of  COVID vaccination at [address] and is scheduled for 2nd dose of Flaca Muff on February 24th         Relevant Medications    albuterol (2 5 mg/3 mL) 0 083 % nebulizer solution    Obstructive sleep apnea      He did have diagnostic sleep study done June 2019 which showed mild BEVERLY with overall AHI of 5 5 and this increased to 14 9 during REM sleep  He did not want pursue CPAP therapy  Not have any excessive daytime somnolence  He is trying to lose weight  Cardiovascular and Mediastinum    Peripheral arterial disease ([facility] )     On September 17, 2020 Kim Mariscal did have angioplasty procedure right superficial femoral artery occlusion and also had arthrectomy done  This was done by vascular surgeon Dr Kaiser Horse  He was having some clotted occasion pains in his right leg with exertional activity but since procedure  No longer is getting pain in his right calf with activity  he is on Plavix 75 mg daily and aspirin 81 mg daily                 CC:   Some shortness of breath with activity but this is stable,       HPI      Kim Mariscal   Has moderate COPD with FEV1 of 1 95 L or 62% predicted  He does use Anoro 1 puff daily and does have a rescue ProAir inhaler  He does have mild BEVERLY but did not want to pursue CPAP therapy  He did have a an in-lab diagnostic sleep study done in June 2019  His overall AHI was 5 5 and this increased to 14 9 in non-REM sleep  Mean O2 saturation 92% with amanuel of 84%     Does get some shortness of breath when he walks up a flight of stairs  He did retire in November 2020   He and his wife Oliver Hunt are hoping to move to [facility] soon    He quit smoking December 2017 but did smoke 1 pack per day for 43 years  Does have history of hypertension, hyperlipidemia and peripheral arterial disease  In September 2020 he had  Angioplasty procedure of right superficial femoral artery occlusion initially had SUV arthrectomy done  This was done by vascular surgeon Dr Lu Glaser      Prior to that he was getting some pain in his right leg but no longer having any pain or discomfort in his right leg when he walks    [place] and wife Xin Petty just sold their house in [place], [place]  and will be moving to [place] hopefully in next couple months  Past Medical History:   Diagnosis Date    Arthritis     hands    Centrilobular emphysema (HCC)     COPD (chronic obstructive pulmonary disease) (HCC)     BOB (dyspnea on exertion)     Hypertension     URI (upper respiratory infection)        Past Surgical History:   Procedure Laterality Date    COLONOSCOPY N/A 1/19/2017    Procedure: COLONOSCOPY;  Surgeon: James Waggoner MD;  Location: [place] [address] GI LAB;   Service:    Renda Sandifer ABDOMINAL Murlene Hitchcock  9/17/2020    SD EXCISION MULTIPLE EXTERNAL PAPILLAE/TAGS ANUS N/A 7/8/2020    Procedure: EXCISION  BIOPSY LESION/ MASS  ANAL/RECTAL;  Surgeon: Shanika Lawson MD;  Location: [address] [place] [address];  Service: General    VASECTOMY           Current Outpatient Medications:     albuterol (2 5 mg/3 mL) 0 083 % nebulizer solution, Take 1 vial (2 5 mg total) by nebulization every 4 (four) hours as needed for wheezing or shortness of breath, Disp: 180 vial, Rfl: 6    albuterol (ProAir HFA) 90 mcg/act inhaler, Inhale 2 puffs every 4 (four) hours as needed for wheezing or shortness of breath, Disp: 1 Inhaler, Rfl: 11    amlodipine-olmesartan (ARNOLD) 5-40 MG, daily , Disp: , Rfl: 3    aspirin 81 mg chewable tablet, Chew 81 mg daily, Disp: , Rfl:     clopidogrel (PLAVIX) 75 mg tablet, Take 1 tablet (75 mg total) by mouth daily, Disp: 90 tablet, Rfl: 0    clopidogrel (PLAVIX) 75 mg tablet, Take 1 tablet (75 mg total) by mouth daily, Disp: 60 tablet, Rfl: 0    Multiple Vitamins-Minerals (CENTRUM ADULTS PO), Take 1 tablet by mouth daily, Disp: , Rfl:     rosuvastatin (CRESTOR) 20 MG tablet, , Disp: , Rfl:     sildenafil (VIAGRA) 100 mg tablet, , Disp: , Rfl:     umeclidinium-vilanterol (Anoro Ellipta) 62 5-25 MCG/INH inhaler, Inhale 1 puff daily, Disp: 60 each, Rfl: 11    No Known Allergies    Social History     Tobacco Use    Smoking status: Former Smoker     Packs/day: 1 00     Years: 43 00     Pack years: 43 00     Start date: 1/30/1973     Quit date: 12/15/2017     Years since quitting: 3 1    Smokeless tobacco: Never Used    Tobacco comment: patient vapes occasionally   Substance Use Topics    Alcohol use: Yes     Alcohol/week: 6 0 standard drinks     Types: 6 Cans of beer per week     Frequency: 2-4 times a month     Drinks per session: 5 or 6     Binge frequency: Weekly     Comment: weekends         Family History   Problem Relation Age of Onset    Other Mother         cardiac disorder    Alzheimer's disease Mother     Heart disease Father     No Known Problems Sister     No Known Problems Brother     No Known Problems Maternal Aunt     No Known Problems Maternal Uncle     No Known Problems Paternal Aunt     No Known Problems Paternal Uncle     No Known Problems Maternal Grandmother     No Known Problems Maternal Grandfather     No Known Problems Paternal Grandmother     No Known Problems Paternal Grandfather     ADD / ADHD Neg Hx     Anesthesia problems Neg Hx     Cancer Neg Hx     Clotting disorder Neg Hx     Collagen disease Neg Hx     Diabetes Neg Hx     Dislocations Neg Hx     Learning disabilities Neg Hx     Neurological problems Neg Hx     Osteoporosis Neg Hx     Rheumatologic disease Neg Hx     Scoliosis Neg Hx     Vascular Disease Neg Hx        Review of Systems   Constitutional: Negative for chills, fever and unexpected weight change     HENT: Negative for congestion, rhinorrhea and sore throat  Eyes: Negative for discharge and redness  Respiratory:        Has some chronic exertional shortness of breath   Cardiovascular: Negative for chest pain, palpitations and leg swelling  Gastrointestinal: Negative for abdominal distention, abdominal pain and nausea  Endocrine: Negative for polydipsia and polyphagia  Genitourinary: Negative for dysuria  Musculoskeletal: Negative for joint swelling and myalgias  Skin: Negative for rash  Neurological: Negative for light-headedness  Psychiatric/Behavioral: Negative for confusion  Vitals:    02/09/21 0829   BP: 128/62   Pulse: 67   Resp: 12   Temp: (!) 97 °F (36 1 °C)   SpO2: 97%           Physical Exam  Constitutional:       General: He is not in acute distress  Appearance: Normal appearance  He is well-developed  HENT:      Head: Normocephalic  Nose: Nose normal       Mouth/Throat:      Mouth: Mucous membranes are moist       Pharynx: Oropharynx is clear  No oropharyngeal exudate  Eyes:      Conjunctiva/sclera: Conjunctivae normal       Pupils: Pupils are equal, round, and reactive to light  Neck:      Musculoskeletal: No muscular tenderness  Cardiovascular:      Rate and Rhythm: Normal rate and regular rhythm  Heart sounds: Normal heart sounds  Pulmonary:      Effort: Pulmonary effort is normal       Comments: Lung sounds are clear  No wheezes crackles or rhonchi  Abdominal:      General: There is no distension  Palpations: Abdomen is soft  Tenderness: There is no abdominal tenderness  Musculoskeletal:      Comments: No edema, cyanosis or clubbing   Skin:     General: Skin is warm and dry  Neurological:      Mental Status: He is alert and oriented to person, place, and time  Psychiatric:         Mood and Affect: Mood normal          Behavior: Behavior normal          Thought Content:  Thought content normal

## 2021-03-01 DIAGNOSIS — Z23 ENCOUNTER FOR IMMUNIZATION: ICD-10-CM

## 2021-03-23 ENCOUNTER — LAB (OUTPATIENT)
Dept: LAB | Facility: HOSPITAL | Age: 66
End: 2021-03-23
Attending: INTERNAL MEDICINE
Payer: MEDICARE

## 2021-03-23 ENCOUNTER — TRANSCRIBE ORDERS (OUTPATIENT)
Dept: ADMINISTRATIVE | Facility: HOSPITAL | Age: 66
End: 2021-03-23

## 2021-03-23 DIAGNOSIS — I10 ESSENTIAL HYPERTENSION, MALIGNANT: ICD-10-CM

## 2021-03-23 DIAGNOSIS — I73.9 PERIPHERAL VASCULAR DISEASE, UNSPECIFIED (HCC): ICD-10-CM

## 2021-03-23 DIAGNOSIS — E78.5 HYPERLIPIDEMIA, UNSPECIFIED HYPERLIPIDEMIA TYPE: ICD-10-CM

## 2021-03-23 DIAGNOSIS — R73.01 IMPAIRED FASTING GLUCOSE: ICD-10-CM

## 2021-03-23 DIAGNOSIS — Z00.00 ROUTINE GENERAL MEDICAL EXAMINATION AT A HEALTH CARE FACILITY: Primary | ICD-10-CM

## 2021-03-23 DIAGNOSIS — Z00.00 ROUTINE GENERAL MEDICAL EXAMINATION AT A HEALTH CARE FACILITY: ICD-10-CM

## 2021-03-23 LAB
ALBUMIN SERPL BCP-MCNC: 4.2 G/DL (ref 3.5–5)
ALP SERPL-CCNC: 51 U/L (ref 46–116)
ALT SERPL W P-5'-P-CCNC: 56 U/L (ref 12–78)
ANION GAP SERPL CALCULATED.3IONS-SCNC: 7 MMOL/L (ref 4–13)
AST SERPL W P-5'-P-CCNC: 21 U/L (ref 5–45)
BILIRUB SERPL-MCNC: 1.1 MG/DL (ref 0.2–1)
BUN SERPL-MCNC: 33 MG/DL (ref 5–25)
CALCIUM SERPL-MCNC: 9.3 MG/DL (ref 8.3–10.1)
CHLORIDE SERPL-SCNC: 101 MMOL/L (ref 100–108)
CHOLEST SERPL-MCNC: 158 MG/DL (ref 50–200)
CO2 SERPL-SCNC: 28 MMOL/L (ref 21–32)
CREAT SERPL-MCNC: 1.19 MG/DL (ref 0.6–1.3)
GFR SERPL CREATININE-BSD FRML MDRD: 64 ML/MIN/1.73SQ M
GLUCOSE P FAST SERPL-MCNC: 105 MG/DL (ref 65–99)
HDLC SERPL-MCNC: 76 MG/DL
LDLC SERPL CALC-MCNC: 67 MG/DL (ref 0–100)
NONHDLC SERPL-MCNC: 82 MG/DL
POTASSIUM SERPL-SCNC: 4.4 MMOL/L (ref 3.5–5.3)
PROT SERPL-MCNC: 7.6 G/DL (ref 6.4–8.2)
SODIUM SERPL-SCNC: 136 MMOL/L (ref 136–145)
TRIGL SERPL-MCNC: 73 MG/DL

## 2021-03-23 PROCEDURE — 80053 COMPREHEN METABOLIC PANEL: CPT

## 2021-03-23 PROCEDURE — 80061 LIPID PANEL: CPT

## 2021-03-23 PROCEDURE — 36415 COLL VENOUS BLD VENIPUNCTURE: CPT
